# Patient Record
Sex: FEMALE | Employment: UNEMPLOYED | URBAN - METROPOLITAN AREA
[De-identification: names, ages, dates, MRNs, and addresses within clinical notes are randomized per-mention and may not be internally consistent; named-entity substitution may affect disease eponyms.]

---

## 2021-02-13 ENCOUNTER — HOSPITAL ENCOUNTER (INPATIENT)
Age: 51
LOS: 4 days | Discharge: HOME | DRG: 885 | End: 2021-02-17
Attending: PSYCHIATRY & NEUROLOGY | Admitting: PSYCHIATRY & NEUROLOGY
Payer: MEDICARE

## 2021-02-13 PROBLEM — F31.9 BIPOLAR DISORDER (HCC): Status: ACTIVE | Noted: 2021-02-13

## 2021-02-13 PROCEDURE — 65220000003 HC RM SEMIPRIVATE PSYCH

## 2021-02-13 PROCEDURE — 74011250637 HC RX REV CODE- 250/637: Performed by: NURSE PRACTITIONER

## 2021-02-13 RX ORDER — BENZTROPINE MESYLATE 1 MG/1
1 TABLET ORAL
Status: DISCONTINUED | OUTPATIENT
Start: 2021-02-13 | End: 2021-02-17 | Stop reason: HOSPADM

## 2021-02-13 RX ORDER — OLANZAPINE 5 MG/1
5 TABLET ORAL
Status: DISCONTINUED | OUTPATIENT
Start: 2021-02-13 | End: 2021-02-17 | Stop reason: HOSPADM

## 2021-02-13 RX ORDER — DIPHENHYDRAMINE HYDROCHLORIDE 50 MG/ML
50 INJECTION, SOLUTION INTRAMUSCULAR; INTRAVENOUS
Status: DISCONTINUED | OUTPATIENT
Start: 2021-02-13 | End: 2021-02-17 | Stop reason: HOSPADM

## 2021-02-13 RX ORDER — IBUPROFEN 400 MG/1
400 TABLET ORAL
Status: DISCONTINUED | OUTPATIENT
Start: 2021-02-13 | End: 2021-02-17 | Stop reason: HOSPADM

## 2021-02-13 RX ORDER — HALOPERIDOL 5 MG/ML
5 INJECTION INTRAMUSCULAR
Status: DISCONTINUED | OUTPATIENT
Start: 2021-02-13 | End: 2021-02-17 | Stop reason: HOSPADM

## 2021-02-13 RX ORDER — TRAZODONE HYDROCHLORIDE 50 MG/1
50 TABLET ORAL
Status: DISCONTINUED | OUTPATIENT
Start: 2021-02-13 | End: 2021-02-17 | Stop reason: HOSPADM

## 2021-02-13 RX ORDER — HYDROXYZINE 50 MG/1
50 TABLET, FILM COATED ORAL
Status: DISCONTINUED | OUTPATIENT
Start: 2021-02-13 | End: 2021-02-17 | Stop reason: HOSPADM

## 2021-02-13 RX ORDER — ACETAMINOPHEN 325 MG/1
650 TABLET ORAL
Status: DISCONTINUED | OUTPATIENT
Start: 2021-02-13 | End: 2021-02-17 | Stop reason: HOSPADM

## 2021-02-13 RX ORDER — ADHESIVE BANDAGE
30 BANDAGE TOPICAL DAILY PRN
Status: DISCONTINUED | OUTPATIENT
Start: 2021-02-13 | End: 2021-02-17 | Stop reason: HOSPADM

## 2021-02-13 RX ORDER — LORAZEPAM 2 MG/ML
1 INJECTION INTRAMUSCULAR
Status: DISCONTINUED | OUTPATIENT
Start: 2021-02-13 | End: 2021-02-17 | Stop reason: HOSPADM

## 2021-02-13 RX ADMIN — TRAZODONE HYDROCHLORIDE 50 MG: 50 TABLET ORAL at 23:44

## 2021-02-14 PROBLEM — G83.4 CAUDA EQUINA SYNDROME (HCC): Status: ACTIVE | Noted: 2021-02-14

## 2021-02-14 PROBLEM — Z72.0 TOBACCO ABUSE: Status: ACTIVE | Noted: 2021-02-14

## 2021-02-14 PROBLEM — M43.16 SPONDYLOLISTHESIS AT L4-L5 LEVEL: Status: ACTIVE | Noted: 2021-02-14

## 2021-02-14 PROCEDURE — 74011250637 HC RX REV CODE- 250/637: Performed by: NURSE PRACTITIONER

## 2021-02-14 PROCEDURE — 94640 AIRWAY INHALATION TREATMENT: CPT

## 2021-02-14 PROCEDURE — 65220000003 HC RM SEMIPRIVATE PSYCH

## 2021-02-14 PROCEDURE — 74011000250 HC RX REV CODE- 250: Performed by: NURSE PRACTITIONER

## 2021-02-14 RX ORDER — IBUPROFEN 800 MG/1
800 TABLET ORAL 3 TIMES DAILY
COMMUNITY
End: 2021-02-17

## 2021-02-14 RX ORDER — FENOFIBRATE 145 MG/1
145 TABLET, COATED ORAL DAILY
Status: DISCONTINUED | OUTPATIENT
Start: 2021-02-15 | End: 2021-02-17 | Stop reason: HOSPADM

## 2021-02-14 RX ORDER — IBUPROFEN 200 MG
1 TABLET ORAL DAILY
Status: DISCONTINUED | OUTPATIENT
Start: 2021-02-14 | End: 2021-02-15

## 2021-02-14 RX ORDER — BACLOFEN 10 MG/1
20 TABLET ORAL 3 TIMES DAILY
Status: DISCONTINUED | OUTPATIENT
Start: 2021-02-14 | End: 2021-02-17 | Stop reason: HOSPADM

## 2021-02-14 RX ORDER — MONTELUKAST SODIUM 10 MG/1
10 TABLET ORAL DAILY
COMMUNITY

## 2021-02-14 RX ORDER — QUETIAPINE FUMARATE 100 MG/1
300 TABLET, FILM COATED ORAL
Status: DISCONTINUED | OUTPATIENT
Start: 2021-02-14 | End: 2021-02-15

## 2021-02-14 RX ORDER — MONTELUKAST SODIUM 10 MG/1
10 TABLET ORAL DAILY
Status: DISCONTINUED | OUTPATIENT
Start: 2021-02-15 | End: 2021-02-17 | Stop reason: HOSPADM

## 2021-02-14 RX ORDER — QUETIAPINE FUMARATE 300 MG/1
300 TABLET, FILM COATED ORAL
COMMUNITY
End: 2021-02-17

## 2021-02-14 RX ORDER — BACLOFEN 20 MG/1
20 TABLET ORAL 3 TIMES DAILY
COMMUNITY

## 2021-02-14 RX ORDER — TAMSULOSIN HYDROCHLORIDE 0.4 MG/1
0.4 CAPSULE ORAL DAILY
Status: DISCONTINUED | OUTPATIENT
Start: 2021-02-15 | End: 2021-02-17 | Stop reason: HOSPADM

## 2021-02-14 RX ORDER — SUMATRIPTAN 50 MG/1
50 TABLET, FILM COATED ORAL
COMMUNITY
End: 2021-02-17

## 2021-02-14 RX ORDER — FENOFIBRATE 145 MG/1
145 TABLET, COATED ORAL DAILY
COMMUNITY

## 2021-02-14 RX ORDER — TAMSULOSIN HYDROCHLORIDE 0.4 MG/1
0.4 CAPSULE ORAL DAILY
COMMUNITY

## 2021-02-14 RX ORDER — AZELASTINE 1 MG/ML
2 SPRAY, METERED NASAL 2 TIMES DAILY
Status: DISCONTINUED | OUTPATIENT
Start: 2021-02-14 | End: 2021-02-17 | Stop reason: HOSPADM

## 2021-02-14 RX ORDER — BUDESONIDE AND FORMOTEROL FUMARATE DIHYDRATE 160; 4.5 UG/1; UG/1
2 AEROSOL RESPIRATORY (INHALATION) 2 TIMES DAILY
COMMUNITY

## 2021-02-14 RX ORDER — AZELASTINE 1 MG/ML
2 SPRAY, METERED NASAL 2 TIMES DAILY
COMMUNITY

## 2021-02-14 RX ADMIN — MENTHOL, METHYL SALICYLATE: 10; 15 CREAM TOPICAL at 20:24

## 2021-02-14 RX ADMIN — QUETIAPINE FUMARATE 300 MG: 100 TABLET ORAL at 20:21

## 2021-02-14 RX ADMIN — HYDROXYZINE HYDROCHLORIDE 50 MG: 50 TABLET, FILM COATED ORAL at 02:06

## 2021-02-14 RX ADMIN — OLANZAPINE 5 MG: 5 TABLET, FILM COATED ORAL at 02:06

## 2021-02-14 RX ADMIN — ACETAMINOPHEN 650 MG: 325 TABLET ORAL at 11:21

## 2021-02-14 RX ADMIN — ARFORMOTEROL TARTRATE: 15 SOLUTION RESPIRATORY (INHALATION) at 23:24

## 2021-02-14 RX ADMIN — IBUPROFEN 400 MG: 400 TABLET, FILM COATED ORAL at 14:46

## 2021-02-14 RX ADMIN — IBUPROFEN 400 MG: 400 TABLET, FILM COATED ORAL at 07:44

## 2021-02-14 NOTE — PROGRESS NOTES
[facility] 60  Master Treatment Plan for Job Liliana    Date Treatment Plan Initiated: 02/14/2021    Treatment Plan Modalities:  Type of Modality Amount  (x minutes) Frequency (x/week) Duration (x days) Name of Responsible Staff   [address]  meetings to encourage peer interactions 15 7 [address] [facility] and [address] [facility] psychotherapy to assist in building coping skills and internal controls 60 7 1 Lauren Nelson   Therapeutic activity groups to build coping skills 60 7 1 Lauren Nelson   Psychoeducation in group setting to address:   Medication education   15 [address] PharmD   Coping skills   30 3 1 Lauren Nelson   Relaxation techniques         Symptom management         Discharge planning   61 2 255 [facility]    60 2 1 Chaplain YOUNG   61 1 1 volunteer   Recovery/AA/NA      volunteer   Physician medication management   15 7 1 MD Michelle/ROSIBEL Sandoval   Family meeting/discharge planning   15 2 1 Dorcas Corbin and Pura Ching                                     Problem: Altered Thought Process (Adult/Pediatric)  Goal: *STG: Participates in treatment plan  Outcome: Progressing Towards Goal  Goal: *STG: Remains safe in hospital  Outcome: Progressing Towards Goal  Goal: *STG: Seeks staff when feelings of anxiety and fear arise  Outcome: Progressing Towards Goal  Goal: *STG: Decreased delusional thinking  Outcome: Progressing Towards Goal  Goal: *STG: Demonstrates ability to understand and use improved judgment in daily activities and relationships  Outcome: Progressing Towards Goal       Pt visible on the unit. Approaches nurses station and requests \"my muscle relaxer and ibuprofen\" RN offers education regarding treatment team process and orders needed for medications, pt accepts. PRN PO Ibuprofen 400mg given. Pt sitting in dining room among peers, eating breakfast and watching TV. Will continue to monitor with q15 minute checks for safety.

## 2021-02-14 NOTE — CONSULTS
[address] [facility] Adult  Hospitalist Group  History and Physical    Primary Care Provider: None  Date of Service:  2/14/2021    Subjective:     Eris Leon is a 48 y.o. female with a past medical history of COPD, Cauda Equina, L4-L5 Spondylolisthesis with central stenosis, and bipolar disorder. She presented to [facility] ED accompanied by law enforcement. Law enforcement were called to do a well-being check, they were notified that the patient was acting erratic. Upon their arrival she was yelling and screaming with erratic behavior. Stating that her ex- and daughter were trying to kill her so she needed to kill them first. Transferred to [place] [address] under [facility] for further treatment. She has had multiple psychiatric admission. Hospitalist was contacted for H&P and medical management. History was taken from patient and chart. She was cooperative throughout examination although had to redirect her back to the conversation multiple times. States that she is from [place] and has all of her follow up care there. She moved to [place] last October to get away from her parents and to start life over. States that she will have \"back surgery\" as soon as she can quit smoking. She denies any acute medical complaints. Feels that her feet are swollen. No edema noted on examination. Has right leg nerve damage that results in cramps, takes Baclofen. Has a \"mild case\" of COPD. Utilizes inhalers daily, not on home O2. Endorses bowel and bladder issues secondary to Cauda Equina. Feels that it takes her a long time to use the bathroom. Take Flomax for urinary retention. Denies any pain, syncope, dizziness, shortness of breath, chest pain or tightness, nausea, vomiting or diarrhea   . Review of Systems:    A comprehensive review of systems was negative except for that written in the History of Present Illness. No past medical history on file.    No past surgical history on file.  Prior to Admission medications    Medication Sig Start Date End Date Taking? Authorizing Provider   baclofen (LIORESAL) 20 mg tablet Take 20 mg by mouth three (3) times daily. Yes Provider, Historical   SUMAtriptan (Imitrex) 50 mg tablet Take 50 mg by mouth daily as needed for Migraine. Yes Provider, Historical   ibuprofen (MOTRIN) 800 mg tablet Take 800 mg by mouth three (3) times daily. Yes Provider, Historical   azelastine (ASTELIN) 137 mcg (0.1 %) nasal spray 2 Sprays by Both Nostrils route two (2) times a day. Use in each nostril as directed   Yes Provider, Historical   QUEtiapine (SEROqueL) 300 mg tablet Take 300 mg by mouth nightly. Yes Provider, Historical   tamsulosin (Flomax) 0.4 mg capsule Take 0.4 mg by mouth daily. Yes Provider, Historical   montelukast (Singulair) 10 mg tablet Take 10 mg by mouth daily. Yes Provider, Historical   fenofibrate nanocrystallized (TRICOR) 145 mg tablet Take 145 mg by mouth daily. Yes Provider, Historical   budesonide-formoteroL (Symbicort) 160-4.5 mcg/actuation HFAA Take 2 Puffs by inhalation two (2) times a day. Yes Provider, Historical     Allergies   Allergen Reactions    Pcn [Penicillins] Hives      No family history on file. SOCIAL HISTORY:  Patient resides at Home. Patient ambulates with Independent . Smoking history: 1ppd. Has smoked since she was 15years old. Was smoking 2-3 packs a day but is trying to quit so she can have surgery   Alcohol history: last drink was in June 2020. States that she is an alcoholic and it will kill her if she drinks. Quit heavy drinking 6 years ago. Illicit drug history: Smokes marijuana daily \"to help with the pain\". UDS + for THC only. Has a history of substance abuse.          Objective:       Physical Exam:   Visit Vitals  /82 (BP 1 Location: Right upper arm, BP Patient Position: Sitting)   Pulse 92   Temp 97.9 °F (36.6 °C)   Resp 16   Ht 5' 4\" (1.626 m)   Wt 68 kg (150 lb)   SpO2 96%   BMI 25.75 kg/m²     General appearance: alert, cooperative, no distress, appears stated age  Back: symmetric, no curvature. ROM normal. No CVA tenderness. Lungs: clear to auscultation bilaterally  Heart: regular rate and rhythm, S1, S2 normal, no murmur, click, rub or gallop  Abdomen: soft, non-tender. Bowel sounds normal. No masses,  no organomegaly  Extremities: extremities normal, atraumatic, no cyanosis or edema  Pulses: 2+ and symmetric  Skin: Skin color, texture, turgor normal. No rashes or lesions  Neurologic: Grossly normal  Cap refill: Brisk, less than 3 seconds  Pulses: 2+, symmetric in all extremities    ECG:  NA     Data Review: All diagnostic labs and studies have been reviewed. Chest x-ray NA. Assessment:     Active Problems:    Bipolar disorder ([facility] (2/13/2021)      Cauda equina syndrome ([facility] (2/14/2021)      Spondylolisthesis at L4-L5 level (2/14/2021)      Tobacco abuse (2/14/2021)        Plan:     1. COPD  - not in exacerbation, does not use home O2  - Keep O2 sats >88%, supplemental O2 as needed  - Start neb treatments  -  Follow up with PCP outpatient     2. Cauda Equina Syndrome/ Sponylolisthesis L4-L5   - Pain control  - Methyl Salicylate- menthol TID PRN   - Continue Flomax for urinary retention   - Follow up with Orthopedic Surgery outpatient. (had appointment on 02/04/2020)    3. Elevated creatine  - sCr- 1.16, unknown baseline   - Encourage PO intake   - Avoid nephrotoxins and renal dose medications   - Monitor labs   - Follow up with PCP outpatient. 4. Leukocytosis   - Likely reactive   - WBC 14.7  - No signs of infection noted. - Covid negative   - UA negative     5. Tobacco abuse   - Smoking cessation counseling completed   - Nicotine patch     6. Marijuana usage   - Cessation education completed     7. Bipolar disorder   - Management per primary team.       FUNCTIONAL STATUS PRIOR TO HOSPITALIZATION (including history of recent falls): Independent     Thank you for allowing us to participate in patient's care. If you have any further questions or need assistance please do not hesitate to contact us again. We will sign off now.      Signed By: Juanita Berger NP     February 14, 2021

## 2021-02-14 NOTE — BH NOTES
TRANSFER - IN REPORT:    Verbal report received from [place] on Mary Patel  being received from [facility] for routine progression of care      Report consisted of patients Situation, Background, Assessment and   Recommendations(SBAR). Information from the following report(s) SBAR was reviewed with the receiving nurse. Opportunity for questions and clarification was provided. Assessment completed upon patients arrival to unit and care assumed.

## 2021-02-14 NOTE — BH NOTES
Admission Note    Pt.  Received from [facility] ED  TDO  Calm and cooperative   AAO4  UDS +THC  BAL <10  Denies SI HI AH VH        PRN Medication Documentation-[id number]    Specific patient behavior that led to need for PRN medication: feeling restless  Staff interventions attempted prior to PRN being given: lights dim and quiet enviorment  PRN medication given: Trazadone  Patient response/effectiveness of PRN medication: pt. resting

## 2021-02-14 NOTE — PROGRESS NOTES
Problem: Altered Thought Process (Adult/Pediatric)  Goal: *STG: Remains safe in hospital  Outcome: Progressing Towards Goal     Problem: Falls - Risk of  Goal: *Absence of Falls  Description: Document Augusto Fall Risk and appropriate interventions in the flowsheet. Outcome: Progressing Towards Goal  Note: Fall Risk Interventions:     Medication Interventions: Teach patient to arise slowly    Received pt awake in bed during shift change. Some mild anxiety noted during quiet time. Staff will continue to monitor q 15 min checks.

## 2021-02-14 NOTE — BH NOTES
Primary Nurse Celestina Noble RN and Leann Strauss RN performed a dual skin assessment on this patient No impairment noted  Pranay score is 23      Pt has multiple tattoos all over body  Pressure Injury Documentation  (COMPLETE ONE LABEL PER PRESSURE INJURY)  For further information, please review corresponding Wound Care flowsheet. Dorene Calle has:    No pressure injury noted and pressure ulcer prevention initiated.         Celestina Noble RN

## 2021-02-14 NOTE — BH NOTES
PRN Medication Documentation    Specific patient behavior that led to need for PRN medication: pt c/o 5/10 aching burning pain to right and left foot   Staff interventions attempted prior to PRN being given: education   PRN medication given: po 400 mg Motrin   Patient response/effectiveness of PRN medication: pt alert and orient resting in bed

## 2021-02-14 NOTE — BH NOTES
PRN Medication Documentation    Specific patient behavior that led to need for PRN medication: restless, anxious, racing thoughts   Staff interventions attempted prior to PRN being given: education  PRN medication given: Zyprexa/Atarax  Patient response/effectiveness of PRN medication: not effective

## 2021-02-15 LAB
ATRIAL RATE: 101 BPM
CALCULATED P AXIS, ECG09: 42 DEGREES
CALCULATED R AXIS, ECG10: 73 DEGREES
CALCULATED T AXIS, ECG11: 60 DEGREES
DIAGNOSIS, 93000: NORMAL
P-R INTERVAL, ECG05: 120 MS
Q-T INTERVAL, ECG07: 338 MS
QRS DURATION, ECG06: 76 MS
QTC CALCULATION (BEZET), ECG08: 438 MS
VENTRICULAR RATE, ECG03: 101 BPM

## 2021-02-15 PROCEDURE — 74011250637 HC RX REV CODE- 250/637: Performed by: NURSE PRACTITIONER

## 2021-02-15 PROCEDURE — 65220000003 HC RM SEMIPRIVATE PSYCH

## 2021-02-15 PROCEDURE — 93005 ELECTROCARDIOGRAM TRACING: CPT

## 2021-02-15 PROCEDURE — 74011250637 HC RX REV CODE- 250/637: Performed by: PSYCHIATRY & NEUROLOGY

## 2021-02-15 RX ORDER — LANOLIN ALCOHOL/MO/W.PET/CERES
400 CREAM (GRAM) TOPICAL
Status: DISCONTINUED | OUTPATIENT
Start: 2021-02-15 | End: 2021-02-17 | Stop reason: HOSPADM

## 2021-02-15 RX ORDER — QUETIAPINE FUMARATE 100 MG/1
350 TABLET, FILM COATED ORAL
Status: DISCONTINUED | OUTPATIENT
Start: 2021-02-15 | End: 2021-02-17 | Stop reason: HOSPADM

## 2021-02-15 RX ORDER — FLUTICASONE PROPIONATE 50 MCG
2 SPRAY, SUSPENSION (ML) NASAL 2 TIMES DAILY
COMMUNITY
End: 2021-02-17

## 2021-02-15 RX ORDER — CETIRIZINE HCL 10 MG
10 TABLET ORAL DAILY
COMMUNITY
End: 2021-02-17

## 2021-02-15 RX ORDER — IBUPROFEN 200 MG
1 TABLET ORAL DAILY
Status: DISCONTINUED | OUTPATIENT
Start: 2021-02-15 | End: 2021-02-17 | Stop reason: HOSPADM

## 2021-02-15 RX ORDER — IBUPROFEN 200 MG
1 TABLET ORAL DAILY
Status: DISCONTINUED | OUTPATIENT
Start: 2021-02-16 | End: 2021-02-15

## 2021-02-15 RX ORDER — LANOLIN ALCOHOL/MO/W.PET/CERES
400 CREAM (GRAM) TOPICAL DAILY
COMMUNITY

## 2021-02-15 RX ORDER — BUDESONIDE AND FORMOTEROL FUMARATE DIHYDRATE 160; 4.5 UG/1; UG/1
2 AEROSOL RESPIRATORY (INHALATION)
Status: DISCONTINUED | OUTPATIENT
Start: 2021-02-15 | End: 2021-02-17 | Stop reason: HOSPADM

## 2021-02-15 RX ADMIN — ACETAMINOPHEN 650 MG: 325 TABLET ORAL at 06:43

## 2021-02-15 RX ADMIN — MENTHOL, METHYL SALICYLATE: 10; 15 CREAM TOPICAL at 08:28

## 2021-02-15 RX ADMIN — BUDESONIDE AND FORMOTEROL FUMARATE DIHYDRATE 2 PUFF: 160; 4.5 AEROSOL RESPIRATORY (INHALATION) at 10:08

## 2021-02-15 RX ADMIN — Medication 400 MG: at 21:14

## 2021-02-15 RX ADMIN — IBUPROFEN 400 MG: 400 TABLET, FILM COATED ORAL at 02:38

## 2021-02-15 RX ADMIN — ACETAMINOPHEN 650 MG: 325 TABLET ORAL at 23:26

## 2021-02-15 RX ADMIN — BACLOFEN 20 MG: 10 TABLET ORAL at 21:52

## 2021-02-15 RX ADMIN — BACLOFEN 20 MG: 10 TABLET ORAL at 16:35

## 2021-02-15 RX ADMIN — MENTHOL, METHYL SALICYLATE: 10; 15 CREAM TOPICAL at 16:36

## 2021-02-15 RX ADMIN — AZELASTINE 2 SPRAY: 1 SPRAY, METERED NASAL at 08:29

## 2021-02-15 RX ADMIN — BACLOFEN 20 MG: 10 TABLET ORAL at 10:33

## 2021-02-15 RX ADMIN — HYDROXYZINE HYDROCHLORIDE 50 MG: 50 TABLET, FILM COATED ORAL at 23:26

## 2021-02-15 RX ADMIN — FENOFIBRATE 145 MG: 145 TABLET ORAL at 10:33

## 2021-02-15 RX ADMIN — BUDESONIDE AND FORMOTEROL FUMARATE DIHYDRATE 2 PUFF: 160; 4.5 AEROSOL RESPIRATORY (INHALATION) at 21:17

## 2021-02-15 RX ADMIN — AZELASTINE 2 SPRAY: 1 SPRAY, METERED NASAL at 16:37

## 2021-02-15 RX ADMIN — IBUPROFEN 400 MG: 400 TABLET, FILM COATED ORAL at 16:30

## 2021-02-15 RX ADMIN — QUETIAPINE FUMARATE 350 MG: 100 TABLET ORAL at 21:14

## 2021-02-15 RX ADMIN — TAMSULOSIN HYDROCHLORIDE 0.4 MG: 0.4 CAPSULE ORAL at 08:29

## 2021-02-15 RX ADMIN — MONTELUKAST 10 MG: 10 TABLET, FILM COATED ORAL at 08:29

## 2021-02-15 NOTE — BH NOTES
PSYCHOSOCIAL ASSESSMENT  :Patient identifying info:   Connie Hsu is a 48 y.o., female admitted 2021 10:49 PM     Presenting problem and precipitating factors: Patient was admitted to the ED at [facility] due to erratic behavior. Per triage, pt reported her ex  and daughter were trying to poison her, and her mother was out to kill her. Patient reports that she has not slept for days. These erratic behaviors occurred ~1 month ago. Hx of OD alcohol and pills- drinking bleach. Eva Maria shared that there are demons and ghosts who work for her to get rid of the weak. Mental status assessment: alert, oriented, endorses grandiose delusions about being a profit and having visions. She speaks of demons, dragons, magic and a mission to save the vulnerable. Strengths: patient has a home to return to and is connected to outpatient services. Collateral information: Francesco beckman    Current psychiatric /substance abuse providers and contact info: Dr. Gasca  at [place], [place]    Previous psychiatric/substance abuse providers and response to treatment: yes, last admission was in 2018     Family history of mental illness or substance abuse: father  by suicide    Substance abuse history:  Positive for [facility]   Social History     Tobacco Use    Smoking status: Current Every Day Smoker     Packs/day: 0.50     Years: 10.00     Pack years: 5.00    Smokeless tobacco: Never Used   Substance Use Topics    Alcohol use: Not on file       History of biomedical complications associated with substance abuse: none noted     Patient's current acceptance of treatment or motivation for change: Under a TDO     Family constellation: per patient, she is engaged and has 4 children. Is significant other involved? Yes     Describe support system: none noted    Describe living arrangements and home environment: patient lives with rk and son.      Health issues:   Hospital Problems  Never Reviewed          Codes Class Noted POA    Cauda equina syndrome ([facility] ICD-10-CM: G83.4  ICD-9-CM: 344.60  2021 Unknown        Spondylolisthesis at L4-L5 level ICD-10-CM: M43.16  ICD-9-CM: 756.12  2021 Unknown        Tobacco abuse ICD-10-CM: Z72.0  ICD-9-CM: 305.1  2021 Unknown        Bipolar disorder ([facility] ICD-10-CM: F31.9  ICD-9-CM: 296.80  2021 Unknown              Trauma history: none noted     Legal issues: none noted     History of  service: none noted     Financial status: SSDI     Muslim/cultural factors: none noted     Education/work history: pt took some college courses     Have you been licensed as a health care professional (current or ): no     Leisure and recreation preferences: none noted     Describe coping skills: limited, ineffectual     Lauren Nelson  2/15/2021

## 2021-02-15 NOTE — PROGRESS NOTES
Problem: Altered Thought Process (Adult/Pediatric)  Goal: *STG: Remains safe in hospital  Outcome: Progressing Towards Goal  Goal: Interventions  Outcome: Progressing Towards Goal     Problem: Patient Education: Go to Patient Education Activity  Goal: Patient/Family Education  Outcome: Progressing Towards Goal

## 2021-02-15 NOTE — BH NOTES
GROUP THERAPY PROGRESS NOTE    Patient is participating in [employer], Discharge & Goals Group. Group time: 50 minutes    Personal goal for participation: Identify an area in which individuals want to make a change     Goal orientation: Personal    Group therapy participation: active    Therapeutic interventions reviewed and discussed: Group discussion on how the healing process works on making change in an individuals life. Group members were handed a checklist of potential things they want to change, and they identified what the issues are, what they meant to ourselves and others, and support. Members also became aware of the various benefits of making these changes and how it increases motivation to persevere. Discussion was on concepts of prioritizing, focusing on one issue at a time, and the use of a step-by-step approach to set a goal on how this will be implemented in discharge. Impression of participation: Bruce How actively participated in group. Her goal is to quit smoking but pt expresses concern and worry doing so as she had tried in the past several times but was unable to. Patient processed her health and prioritized it in group. She acknowledged that it will be a working progress. Cooperative in group. Reports feeling better today.     Lauren Nelson, Supervisee in Social Work

## 2021-02-15 NOTE — PROGRESS NOTES
2300: Resting quietly in bed with eyes closed. No apparent distress. Respirations are even and unlabored. Staff will continue to monitor q15 throughout the shift. Problem: Falls - Risk of  Goal: *Absence of Falls  Description: Document Ulises Palomares Fall Risk and appropriate interventions in the flowsheet.   Outcome: Progressing Towards Goal  Note: Fall Risk Interventions:            Medication Interventions: Teach patient to arise slowly

## 2021-02-15 NOTE — PROGRESS NOTES
Admission Medication Reconciliation:    Information obtained from:  Patient interview, insurance claims data, [facility]  RxQuery data available¹:  YES    Comments/Recommendations: Updated PTA meds/reviewed patient's allergies. 1)  Patient is a good historian in regards to her medications. She reports that she takes her medications regularly. Insurance claims data confirm patient reported information. 2)  Medication changes (since last review): Added  - magnesium oxide 400mg daily  - cetirizine 10mg daily  - fluticasone nasal spray (uses in addition to azelastine)    3)  The [facility]) was assessed to determine fill history of any controlled medications. The patient has NOT filled any controlled medications in the last 6 months. Last was diazepam in 2020. ¹RxQuery pharmacy benefit data reflects medications filled and processed through the patient's insurance, however   this data does NOT capture whether the medication was picked up or is currently being taken by the patient. Allergies:  Pcn [penicillins]    Significant PMH/Disease States: No past medical history on file. Chief Complaint for this Admission:  No chief complaint on file. Prior to Admission Medications:   Prior to Admission Medications   Prescriptions Last Dose Informant Taking? QUEtiapine (SEROqueL) 300 mg tablet 2021 at Unknown time  Yes   Sig: Take 300 mg by mouth nightly. SUMAtriptan (Imitrex) 50 mg tablet 2021 at Unknown time  Yes   Sig: Take 50 mg by mouth daily as needed for Migraine. azelastine (ASTELIN) 137 mcg (0.1 %) nasal spray 2021 at Unknown time  Yes   Si Sprays by Both Nostrils route two (2) times a day. Use in each nostril as directed   baclofen (LIORESAL) 20 mg tablet 2021 at Unknown time  Yes   Sig: Take 20 mg by mouth three (3) times daily.    budesonide-formoteroL (Symbicort) 160-4.5 mcg/actuation HFAA 2021 at Unknown time  Yes   Sig: Take 2 Puffs by inhalation two (2) times a day. cetirizine (ZYRTEC) 10 mg tablet   Yes   Sig: Take 10 mg by mouth daily. fenofibrate nanocrystallized (TRICOR) 145 mg tablet 2021 at Unknown time  Yes   Sig: Take 145 mg by mouth daily. fluticasone propionate (FLONASE) 50 mcg/actuation nasal spray   Yes   Si Sprays by Both Nostrils route two (2) times a day. ibuprofen (MOTRIN) 800 mg tablet 2021 at Unknown time  Yes   Sig: Take 800 mg by mouth three (3) times daily. magnesium oxide (MAG-OX) 400 mg tablet   Yes   Sig: Take 400 mg by mouth daily. montelukast (Singulair) 10 mg tablet 2021 at Unknown time  Yes   Sig: Take 10 mg by mouth daily. tamsulosin (Flomax) 0.4 mg capsule 2021 at Unknown time  Yes   Sig: Take 0.4 mg by mouth daily.       Facility-Administered Medications: None       Kathya Valles, PHARMD

## 2021-02-15 NOTE — BH NOTES
GROUP THERAPY PROGRESS NOTE    Patient is participating in [address] [place] [address]. Group time: 50 minutes    Personal goal for participation: reduce symptoms of anxiety     Goal orientation: Personal    Group therapy participation: active    Therapeutic interventions reviewed and discussed: Group members were handed a worksheet that discussed four strategies for reducing anxiety: deep breathing, progressive muscle relaxation, imagery, and challenging irrational thoughts. These skills were taught in session and each member was encouraged to try each activity in group and process how it can be applied outside of group. Impression of participation: Glo Duffy actively participated in group. She was quieter this afternoon than the morning, but continued to participate in group. Presented with an anxious mood. At the end of group, patient met with MSW and expressed delusions that she is a \"prophet. \" Cooperative in group.      Lauren Nelson, Supervisee in Social Work

## 2021-02-15 NOTE — BH NOTES
GROUP THERAPY PROGRESS NOTE    Patient is participating in Mindfulness group. Group time: 45 minutes    Personal goal for participation: To discussion relaxation techniques and complete a guided imagery exercise. Goal orientation: Personal    Group therapy participation: active    Therapeutic interventions reviewed and discussed: Group discussion was focused ways to relax when feeling stressed or when experiencing negative emotions. Group members then participated in a guided imagery exercise. Group members discussed how they felt before, during and after the exercise. Impression of participation: Pt presented with euthymic mood, clear speech, logical thought process. Pt calm and cooperative, interacted appropriately with staff and peers. Pt stated she uses mindfulness meditation to decrease anxiety daily. Pt actively engaged in guided imagery and endorsed a significant decrease in anxiety following the meditation. Pt identified numerous self-relaxation techniques to use to decrease anxiety including taking a bath and going for a walk in nature.     DOLORES Alcantara

## 2021-02-15 NOTE — BH NOTES
GROUP THERAPY PROGRESS NOTE    Patient is participating in a Process group. Group time: 45 minutes     Personal goal for participation: Discuss patients strengths, positive aspects of life and positive affirmations to increase self-esteem and promote healing. Goal orientation: Personal    Group therapy participation: active    Therapeutic interventions reviewed and discussed: Completion of My Strengths and Qualities activity sheet. Group discussion on personal patients strengths including things they are good at, ways theyve helped others, what makes them unique, and challenges they have overcome. Discussion regarding positive things others say about patients and positive words patient speak over themselves. Group discussed the benefit and power of positive thinking and self-talk and its role in improving mental health challenges. Group discussed having a positive mind set as a helpful coping skill. Impression of participation: Pt presented with anxious mood, agitated, journaling throughout session, fixated on reading crime in the newspaper. Pt hyperverbal and escalated at times, redirectable by . Pt processed tendency to have low self-esteem and deprecating thoughts, states she has been in that place for so long it feels almost comfortable. Pt able to name numerous strengths including being supportive of other people and desiring to change. Pt processed current challenges including overcoming drug use and controlling anger.     DOLORES Noland

## 2021-02-15 NOTE — PROGRESS NOTES
PSYCHIATRIC PROGRESS NOTE       Patient: Connie Hsu MRN: [id number]  SSN: [id number]    YOB: 1970  Age: 48 y.o. Sex: female      Admit Date: 2/13/2021    LOS: 2 days       Chief Complaint:  I guess I am ok. Interval History:  Connie Hsu states she slept better last night. She states she needs to stay a few more days because she needs to work it out with \"verna, [place], oak, and eminem. \" She is convinced that she is a prophet, she can see the future, \"the bad future. \" \"It is our mission to save the weak, the children, the elderly. \" The demons and ghosts are working with her, \"its a lot of magic. \" She denies si or hi. Hallucinations and delusions prominent. She agreed to go up on seroquel. Past Medical History:  No past medical history on file.       ALLERGIES:(reviewed/updated 2/15/2021)  Allergies   Allergen Reactions    Pcn [Penicillins] Hives       Laboratory report:  No results found for: WBC, WBCLT, HGBPOC, HGB, HGBP, HCTPOC, HCT, PHCT, RBCH, PLT, MCV, HGBEXT, HCTEXT, PLTEXT No results found for: NA, K, CL, CO2, AGAP, GLU, BUN, CREA, BUCR, GFRAA, GFRNA, CA, TBIL, TBILI, AP, TP, ALB, GLOB, AGRAT, ALT   Vitals:    02/14/21 1145 02/14/21 1547 02/15/21 0729 02/15/21 1108   BP: (!) 140/83 122/82 (!) 149/84 (!) 143/87   Pulse: 100 92 88 90   Resp: 16 16 16 16   Temp: 97.4 °F (36.3 °C) 97.9 °F (36.6 °C) 98 °F (36.7 °C) 98 °F (36.7 °C)   SpO2: 99% 96% 98% 98%   Weight:       Height:           No results found for: VALF2, VALAC, VALP, VALPR, DS6, CRBAM, CRBAMP, CARB2, XCRBAM  No results found for: LITHM    Vital Signs  Patient Vitals for the past 24 hrs:   Temp Pulse Resp BP SpO2   02/15/21 1108 98 °F (36.7 °C) 90 16 (!) 143/87 98 %   02/15/21 0729 98 °F (36.7 °C) 88 16 (!) 149/84 98 %   02/14/21 1547 97.9 °F (36.6 °C) 92 16 122/82 96 %     Wt Readings from Last 3 Encounters:   02/14/21 68 kg (150 lb)     Temp Readings from Last 3 Encounters:   02/15/21 98 °F (36.7 °C)     BP Readings from Last 3 Encounters:   02/15/21 (!) 143/87     Pulse Readings from Last 3 Encounters:   02/15/21 90       Radiology (reviewed/updated 2/15/2021)  No results found.     Current Facility-Administered Medications   Medication Dose Route Frequency Provider Last Rate Last Admin    budesonide-formoteroL (SYMBICORT) 160-4.5 mcg/actuation HFA inhaler 2 Puff  2 Puff Inhalation BID RT José Antonio Alvarez MD   2 Puff at 02/15/21 1008    nicotine (NICODERM CQ) 21 mg/24 hr patch 1 Patch  1 Patch TransDERmal DAILY Shakira Sandoval NP   1 Patch at 02/14/21 0849    QUEtiapine (SEROquel) tablet 300 mg  300 mg Oral QHS Shakira Sandoval NP   300 mg at 02/14/21 2021    azelastine (ASTELIN) 137mcg/spray nasal spray  2 Spray Both Nostrils BID Mily Farooq NP   2 Spray at 02/15/21 6413    baclofen (LIORESAL) tablet 20 mg  20 mg Oral TID Mily Farooq NP   20 mg at 02/15/21 1033    fenofibrate nanocrystallized (TRICOR) tablet 145 mg  145 mg Oral DAILY Mily Brisker, NP   145 mg at 02/15/21 1033    montelukast (SINGULAIR) tablet 10 mg  10 mg Oral DAILY Mily Brnylaer, NP   10 mg at 02/15/21 0829    tamsulosin (FLOMAX) capsule 0.4 mg  0.4 mg Oral DAILY Mily Brisker, NP   0.4 mg at 02/15/21 2443    methyl salicylate-menthol (BENGAY) 15-10 % cream   Topical TID PRN Mily Farooq NP   Given at 02/15/21 0828    OLANZapine (ZyPREXA) tablet 5 mg  5 mg Oral Q6H PRN [place] VOGT NP   5 mg at 02/14/21 0206    haloperidol lactate (HALDOL) injection 5 mg  5 mg IntraMUSCular Q6H PRN Mayelin Rojo, NP        benztropine (COGENTIN) tablet 1 mg  1 mg Oral BID PRN Mayelin Rojo, NP        diphenhydrAMINE (BENADRYL) injection 50 mg  50 mg IntraMUSCular BID PRN Mayelin Rojo NP        hydrOXYzine HCL (ATARAX) tablet 50 mg  50 mg Oral TID PRN Mayelin Rojo NP   50 mg at 02/14/21 0206    LORazepam (ATIVAN) injection 1 mg  1 mg IntraMUSCular Q4H PRN Mayelin Rojo NP        traZODone (DESYREL) tablet 50 mg  50 mg Oral QHS PRN Mayelin Rojo NP   50 mg at 02/13/21 2344    acetaminophen (TYLENOL) tablet 650 mg  650 mg Oral Q4H PRN Kenton Rojoah A NP   650 mg at 02/15/21 0643    magnesium hydroxide (MILK OF MAGNESIA) 400 mg/5 mL oral suspension 30 mL  30 mL Oral DAILY PRN Mayelin Rojo A, NP        ibuprofen (MOTRIN) tablet 400 mg  400 mg Oral Q8H PRN Mayelin Rojo A NP   400 mg at 02/15/21 0238       Side Effects: (reviewed/updated 2/15/2021)  None reported or admitted to. Review of Systems: (reviewed/updated 2/15/2021)  Appetite: good  Sleep: good   All other Review of Systems: delusions    Mental Status Exam:  Eye contact: Good eye contact  Psychomotor activity: Relaxed   Speech is spontaneous  Thought process: Logical and goal directed   Mood is \"ok\"  Affect: Full   Perception: No avh  Suicidal ideation: No si  Homicidal ideation: No hi  Insight/judgment: Poor  Cognition is grossly intact      Physical Exam:  Musculoskeletal system: steady gait  Tremor not present  Cog wheeling not present      Assessment and Plan:  Caridad Lozano meets criteria for a diagnosis of Unspecified psychotic disorder, rule out schizoaffective disorder, rule out bipolar disorder with psychotic features; schizophrenia. Increase seroquel to 350mg qhs. Continue rest of medications as prescribed. We will closely monitor for safety. We will encourage reality orientation. Disposition planning to continue. I certify that this patients inpatient psychiatric hospital services furnished since the previous certification were, and continue to be, required for treatment that could reasonably be expected to improve the patient's condition, or for diagnostic study, and that the patient continues to need, on a daily basis, active treatment furnished directly by or requiring the supervision of inpatient psychiatric facility personnel.  In addition, the hospital records show that services furnished were intensive treatment services, admission or related services, or equivalent services.       Signed:  Boubacar Pierce NP  2/15/2021

## 2021-02-15 NOTE — INTERDISCIPLINARY ROUNDS
Behavioral Health Interdisciplinary Rounds Patient Name: Efren Ford  Age: 48 y.o. Room/Bed:  730/01 Primary Diagnosis: <principal problem not specified> Admission Status: Voluntary Readmission within 30 days: no 
Power of  in place: no 
Patient requires a blocked bed: no          Reason for blocked bed: VTE Prophylaxis: No 
 
Mobility needs/Fall risk: no 
Flu Vaccine : no  
Nutritional Plan: no 
Consults:         
Labs/Testing due today?: no 
 
Sleep hours:  5.5 Participation in Care/Groups:   
Medication Compliant?: Yes PRNS (last 24 hours): Pain Restraints (last 24 hours):  no 
  
CIWA (range last 24 hours): COWS (range last 24 hours): Alcohol screening (AUDIT) completed -   AUDIT Score: 0 If applicable, date SBIRT discussed in treatment team AND documented:  
AUDIT Screen Score: AUDIT Score: 0 Tobacco - patient is a smoker: Have You Used Tobacco in the Past 30 Days: Yes Illegal Drugs use: Have You Used Any Illegal Substances Over the Past 12 Months: Yes 
 
24 hour chart check complete: yes Patient goal(s) for today: meet treatment team, acclimate to unit Treatment team focus/goals: assess needs for treatment and safe discharge Progress note: Pt admitted with paranoid delusions about her family trying to kill her. LOS:  2  Expected LOS: TBD Financial concerns/prescription coverage:   
Family contact:      
Family requesting physician contact today:   
Discharge plan: return home with fiance and son. Access to weapons :        
Outpatient provider(s): Dr. Avani Calvert at [place], [place] Patient's preferred phone number for follow up call :  
Patient's preferred e-mail address : 
 
Participating treatment team members: Sania Henning NP; Alice Ellis RN; Marisabel Nicole, PharmD;  Radha Ag MSW

## 2021-02-15 NOTE — PROGRESS NOTES
Involuntary committal to [facility] TDO hearing         Problem: Altered Thought Process (Adult/Pediatric)  Goal: *STG: Participates in treatment plan  Outcome: Progressing Towards Goal  ACTIVE PARTICIPATION  IN GROUP SETTINGS    Goal: *STG: Remains safe in hospital  Outcome: Progressing Towards Goal  Pt denies any suicidal or homicidal thoughts. Contracts for safety. Remains on q 15 min safety checks. Goal: *STG: Seeks staff when feelings of anxiety and fear arise  Outcome: Progressing Towards Goal  APPEARS TO HAVE GOOD RELATIONSHIP WITH STAFF  Goal: *STG: Complies with medication therapy  Outcome: Progressing Towards Goal  MEDICATION COMPLIANT DENIES SIDE EFFECTS     STATED \"THERE IS GOOD WITCHERY BUT THAT'S ALL I WILL SAY. \"

## 2021-02-15 NOTE — H&P
[address] [facility] HISTORY AND PHYSICAL    Name:  Elke Ruiz  MR#:  [id number]  :  1970  ACCOUNT #:  [de-identified]  ADMIT DATE:  2021      INITIAL PSYCHIATRIC EVALUATION    CHIEF COMPLAINT:  \"I can predict the future. \"    HISTORY OF PRESENTING ILLNESS:  The patient is a 63-year-old female who is currently admitted at [address] [place] [address] on a voluntary basis. She states that she is currently seeing Dr. Americo Caballero at [place], [place], and is currently being treated for bipolar disorder. She stated she has been admitted numerous times in the past, and her last inpatient psychiatric admission was back in 2018. She is a transfer from [facility].  She stated she does not really know why she is here in the hospital, all she knows is that she had a psych premonition. She also has not slept in days due to the suspicion that she is being poisoned and that she has a mission to accomplish. She notes that she needs to save the planet. She stated this all started about a month ago. When she gets the power, her hand turns red. She states that the Gods have given her the power over a certain period of time. She reports that her daughter was attempting to kill her via serotonin poisoning. She is convinced that she is able to identify gifts in others. She also believes that Trump together with [place] Dakins and the Russians and Nazis will start a war \"to rid the world of the weak. \"  She is also convinced that her mother was trying to kill her. The patient has a history of overdose in a combination of alcohol and pills. She also has a history of drinking bleach. She reports that she currently has ghosts and demons working for her and that she is working against others who are trying to kill off the weak because they have gifts. Her urine drug screen was positive for marijuana, and she states she smokes it on a daily basis.   She currently denies suicidal ideation, homicidal ideation, but continues to endorse paranoia and delusions. PAST MEDICAL HISTORY:  See H and P. No past medical history on file. Labs: (reviewed/updated 2/16/2021)  Patient Vitals for the past 8 hrs:   BP Temp Pulse Resp SpO2   02/16/21 0725 (!) 145/84 97.4 °F (36.3 °C) 90 16 98 %     Labs Reviewed   METABOLIC PANEL, COMPREHENSIVE - Abnormal; Notable for the following components:       Result Value    Chloride 109 (*)     Glucose 110 (*)     All other components within normal limits   MAGNESIUM - Abnormal; Notable for the following components:    Magnesium 2.5 (*)     All other components within normal limits   CBC WITH AUTOMATED DIFF - Abnormal; Notable for the following components:    WBC 13.8 (*)     IMMATURE GRANULOCYTES 1 (*)     ABS. NEUTROPHILS 8.1 (*)     ABS. LYMPHOCYTES 4.5 (*)     ABS. IMM. GRANS. 0.1 (*)     All other components within normal limits   GLUCOSE, FASTING - Abnormal; Notable for the following components:    Glucose 110 (*)     All other components within normal limits   LIPID PANEL - Abnormal; Notable for the following components:    Triglyceride 198 (*)     LDL, calculated 103.4 (*)     All other components within normal limits   HEMOGLOBIN A1C WITH EAG     Lab Results   Component Value Date/Time    Sodium 139 02/16/2021 04:01 AM    Potassium 4.1 02/16/2021 04:01 AM    Chloride 109 (H) 02/16/2021 04:01 AM    CO2 24 02/16/2021 04:01 AM    Anion gap 6 02/16/2021 04:01 AM    Glucose 110 (H) 02/16/2021 04:01 AM    Glucose 110 (H) 02/16/2021 04:01 AM    BUN 9 02/16/2021 04:01 AM    Creatinine 0.77 02/16/2021 04:01 AM    BUN/Creatinine ratio 12 02/16/2021 04:01 AM    GFR est AA >60 02/16/2021 04:01 AM    GFR est non-AA >60 02/16/2021 04:01 AM    Calcium 9.8 02/16/2021 04:01 AM    Bilirubin, total 0.3 02/16/2021 04:01 AM    Alk.  phosphatase 47 02/16/2021 04:01 AM    Protein, total 7.2 02/16/2021 04:01 AM    Albumin 3.9 02/16/2021 04:01 AM    Globulin 3.3 02/16/2021 04:01 AM    A-G Ratio 1.2 02/16/2021 04:01 AM    ALT (SGPT) 35 02/16/2021 04:01 AM     Admission on 02/13/2021   Component Date Value Ref Range Status    Ventricular Rate 02/15/2021 101  BPM Final    Atrial Rate 02/15/2021 101  BPM Final    P-R Interval 02/15/2021 120  ms Final    QRS Duration 02/15/2021 76  ms Final    Q-T Interval 02/15/2021 338  ms Final    QTC Calculation (Bezet) 02/15/2021 438  ms Final    Calculated P Axis 02/15/2021 42  degrees Final    Calculated R Axis 02/15/2021 73  degrees Final    Calculated T Axis 02/15/2021 60  degrees Final    Diagnosis 02/15/2021    Final                    Value:Sinus tachycardia  No previous ECGs available  Confirmed by Areli Arango MD, Karin Abdullahi ([id number]) on 2/15/2021 1:55:36 PM      Sodium 02/16/2021 139  136 - 145 mmol/L Final    Potassium 02/16/2021 4.1  3.5 - 5.1 mmol/L Final    Chloride 02/16/2021 109* 97 - 108 mmol/L Final    CO2 02/16/2021 24  21 - 32 mmol/L Final    Anion gap 02/16/2021 6  5 - 15 mmol/L Final    Glucose 02/16/2021 110* 65 - 100 mg/dL Final    BUN 02/16/2021 9  6 - 20 MG/DL Final    Creatinine 02/16/2021 0.77  0.55 - 1.02 MG/DL Final    BUN/Creatinine ratio 02/16/2021 12  12 - 20   Final    GFR est AA 02/16/2021 >60  >60 ml/min/1.73m2 Final    GFR est non-AA 02/16/2021 >60  >60 ml/min/1.73m2 Final    Calcium 02/16/2021 9.8  8.5 - 10.1 MG/DL Final    Bilirubin, total 02/16/2021 0.3  0.2 - 1.0 MG/DL Final    ALT (SGPT) 02/16/2021 35  12 - 78 U/L Final    AST (SGOT) 02/16/2021 19  15 - 37 U/L Final    Alk.  phosphatase 02/16/2021 47  45 - 117 U/L Final    Protein, total 02/16/2021 7.2  6.4 - 8.2 g/dL Final    Albumin 02/16/2021 3.9  3.5 - 5.0 g/dL Final    Globulin 02/16/2021 3.3  2.0 - 4.0 g/dL Final    A-G Ratio 02/16/2021 1.2  1.1 - 2.2   Final    Magnesium 02/16/2021 2.5* 1.6 - 2.4 mg/dL Final    WBC 02/16/2021 13.8* 3.6 - 11.0 K/uL Final    RBC 02/16/2021 4.47  3.80 - 5.20 M/uL Final    HGB 02/16/2021 13.7  11.5 - 16.0 g/dL Final    HCT 02/16/2021 41.5  35.0 - 47.0 % Final    MCV 02/16/2021 92.8  80.0 - 99.0 FL Final    MCH 02/16/2021 30.6  26.0 - 34.0 PG Final    MCHC 02/16/2021 33.0  30.0 - 36.5 g/dL Final    RDW 02/16/2021 13.8  11.5 - 14.5 % Final    PLATELET 92/51/4553 978  150 - 400 K/uL Final    MPV 02/16/2021 10.4  8.9 - 12.9 FL Final    NRBC 02/16/2021 0.0  0  WBC Final    ABSOLUTE NRBC 02/16/2021 0.00  0.00 - 0.01 K/uL Final    NEUTROPHILS 02/16/2021 59  32 - 75 % Final    LYMPHOCYTES 02/16/2021 32  12 - 49 % Final    MONOCYTES 02/16/2021 7  5 - 13 % Final    EOSINOPHILS 02/16/2021 1  0 - 7 % Final    BASOPHILS 02/16/2021 0  0 - 1 % Final    IMMATURE GRANULOCYTES 02/16/2021 1* 0.0 - 0.5 % Final    ABS. NEUTROPHILS 02/16/2021 8.1* 1.8 - 8.0 K/UL Final    ABS. LYMPHOCYTES 02/16/2021 4.5* 0.8 - 3.5 K/UL Final    ABS. MONOCYTES 02/16/2021 1.0  0.0 - 1.0 K/UL Final    ABS. EOSINOPHILS 02/16/2021 0.1  0.0 - 0.4 K/UL Final    ABS. BASOPHILS 02/16/2021 0.1  0.0 - 0.1 K/UL Final    ABS. IMM.  GRANS. 02/16/2021 0.1* 0.00 - 0.04 K/UL Final    DF 02/16/2021 AUTOMATED    Final    Hemoglobin A1c 02/16/2021 5.5  4.0 - 5.6 % Final    Est. average glucose 02/16/2021 111  mg/dL Final    Glucose 02/16/2021 110* 65 - 100 MG/DL Final    LIPID PROFILE 02/16/2021        Final    Cholesterol, total 02/16/2021 191  <200 MG/DL Final    Triglyceride 02/16/2021 198* <150 MG/DL Final    HDL Cholesterol 02/16/2021 48  MG/DL Final    LDL, calculated 02/16/2021 103.4* 0 - 100 MG/DL Final    VLDL, calculated 02/16/2021 39.6  MG/DL Final    CHOL/HDL Ratio 02/16/2021 4.0  0.0 - 5.0   Final     Vitals:    02/15/21 1108 02/15/21 1552 02/15/21 2004 02/16/21 0725   BP: (!) 143/87 131/82 (!) 146/94 (!) 145/84   Pulse: 90 94 (!) 101 90   Resp: 16 16 18 16   Temp: 98 °F (36.7 °C) 98 °F (36.7 °C) 98.9 °F (37.2 °C) 97.4 °F (36.3 °C)   SpO2: 98% 99% 99% 98%   Weight:       Height:         Recent Results (from the past 24 hour(s))   EKG, 12 LEAD, INITIAL    Collection Time: 02/15/21 12:55 PM   Result Value Ref Range    Ventricular Rate 101 BPM    Atrial Rate 101 BPM    P-R Interval 120 ms    QRS Duration 76 ms    Q-T Interval 338 ms    QTC Calculation (Bezet) 438 ms    Calculated P Axis 42 degrees    Calculated R Axis 73 degrees    Calculated T Axis 60 degrees    Diagnosis       Sinus tachycardia  No previous ECGs available  Confirmed by Areli Arango MD, Karin Abdullahi ([id number]) on 2/15/2021 8:57:99 PM     METABOLIC PANEL, COMPREHENSIVE    Collection Time: 02/16/21  4:01 AM   Result Value Ref Range    Sodium 139 136 - 145 mmol/L    Potassium 4.1 3.5 - 5.1 mmol/L    Chloride 109 (H) 97 - 108 mmol/L    CO2 24 21 - 32 mmol/L    Anion gap 6 5 - 15 mmol/L    Glucose 110 (H) 65 - 100 mg/dL    BUN 9 6 - 20 MG/DL    Creatinine 0.77 0.55 - 1.02 MG/DL    BUN/Creatinine ratio 12 12 - 20      GFR est AA >60 >60 ml/min/1.73m2    GFR est non-AA >60 >60 ml/min/1.73m2    Calcium 9.8 8.5 - 10.1 MG/DL    Bilirubin, total 0.3 0.2 - 1.0 MG/DL    ALT (SGPT) 35 12 - 78 U/L    AST (SGOT) 19 15 - 37 U/L    Alk.  phosphatase 47 45 - 117 U/L    Protein, total 7.2 6.4 - 8.2 g/dL    Albumin 3.9 3.5 - 5.0 g/dL    Globulin 3.3 2.0 - 4.0 g/dL    A-G Ratio 1.2 1.1 - 2.2     MAGNESIUM    Collection Time: 02/16/21  4:01 AM   Result Value Ref Range    Magnesium 2.5 (H) 1.6 - 2.4 mg/dL   CBC WITH AUTOMATED DIFF    Collection Time: 02/16/21  4:01 AM   Result Value Ref Range    WBC 13.8 (H) 3.6 - 11.0 K/uL    RBC 4.47 3.80 - 5.20 M/uL    HGB 13.7 11.5 - 16.0 g/dL    HCT 41.5 35.0 - 47.0 %    MCV 92.8 80.0 - 99.0 FL    MCH 30.6 26.0 - 34.0 PG    MCHC 33.0 30.0 - 36.5 g/dL    RDW 13.8 11.5 - 14.5 %    PLATELET 316 030 - 072 K/uL    MPV 10.4 8.9 - 12.9 FL    NRBC 0.0 0  WBC    ABSOLUTE NRBC 0.00 0.00 - 0.01 K/uL    NEUTROPHILS 59 32 - 75 %    LYMPHOCYTES 32 12 - 49 %    MONOCYTES 7 5 - 13 %    EOSINOPHILS 1 0 - 7 %    BASOPHILS 0 0 - 1 %    IMMATURE GRANULOCYTES 1 (H) 0.0 - 0.5 %    ABS. NEUTROPHILS 8.1 (H) 1.8 - 8.0 K/UL    ABS. LYMPHOCYTES 4.5 (H) 0.8 - 3.5 K/UL    ABS. MONOCYTES 1.0 0.0 - 1.0 K/UL    ABS. EOSINOPHILS 0.1 0.0 - 0.4 K/UL    ABS. BASOPHILS 0.1 0.0 - 0.1 K/UL    ABS. IMM. GRANS. 0.1 (H) 0.00 - 0.04 K/UL    DF AUTOMATED     HEMOGLOBIN A1C WITH EAG    Collection Time: 02/16/21  4:01 AM   Result Value Ref Range    Hemoglobin A1c 5.5 4.0 - 5.6 %    Est. average glucose 111 mg/dL   GLUCOSE, FASTING    Collection Time: 02/16/21  4:01 AM   Result Value Ref Range    Glucose 110 (H) 65 - 100 MG/DL   LIPID PANEL    Collection Time: 02/16/21  4:01 AM   Result Value Ref Range    LIPID PROFILE          Cholesterol, total 191 <200 MG/DL    Triglyceride 198 (H) <150 MG/DL    HDL Cholesterol 48 MG/DL    LDL, calculated 103.4 (H) 0 - 100 MG/DL    VLDL, calculated 39.6 MG/DL    CHOL/HDL Ratio 4.0 0.0 - 5.0         RADIOLOGY REPORTS:  No results found for this or any previous visit. No results found. PAST PSYCHIATRIC HISTORY:  See above. PSYCHOSOCIAL HISTORY:  She reports that she is engaged. She has 4 children. She is currently on SSDI. She states that she took some courses in college. She lives with her fiance and her son. MENTAL STATUS EXAMINATION:  She is alert and oriented in all spheres. She is dressed in street clothes. She reports her mood is okay. Affect is blunted. Speech normal rate and rhythm. Thought process logical and goal directed. She denies suicidal ideation, homicidal ideation, auditory or visual hallucination. Paranoia and delusions noteworthy. Memory seems intact. Intelligence seems average. Insight is poor. Judgment is poor. DIAGNOSES:  Unspecified psychotic disorder, rule out schizoaffective disorder, rule out bipolar disorder with psychotic features; rule out schizophrenia. TREATMENT PLANNING:  I will continue her inpatient stay. She will be provided good support and encouraged to attend groups.   Her safety will be monitored. Her medications will be modified and assessed. Case Management will work on discharge planning. ASSETS AND STRENGTHS:  She is willing to take medications. ESTIMATED LENGTH OF STAY:  5-7 days.         BAR CAI NP      [id number]  D:  02/14/2021 21:45  T:  02/15/2021 2:53  JOB #:  [id number]

## 2021-02-15 NOTE — PROGRESS NOTES
Laboratory Monitoring for Antipsychotics: This patient is currently prescribed the following medication(s):   Current Facility-Administered Medications   Medication Dose Route Frequency    budesonide-formoteroL (SYMBICORT) 160-4.5 mcg/actuation HFA inhaler 2 Puff  2 Puff Inhalation BID RT    magnesium oxide (MAG-OX) tablet 400 mg  400 mg Oral QHS    nicotine (NICODERM CQ) 14 mg/24 hr patch 1 Patch  1 Patch TransDERmal DAILY    QUEtiapine (SEROquel) tablet 350 mg  350 mg Oral QHS    azelastine (ASTELIN) 137mcg/spray nasal spray  2 Spray Both Nostrils BID    baclofen (LIORESAL) tablet 20 mg  20 mg Oral TID    fenofibrate nanocrystallized (TRICOR) tablet 145 mg  145 mg Oral DAILY    montelukast (SINGULAIR) tablet 10 mg  10 mg Oral DAILY    tamsulosin (FLOMAX) capsule 0.4 mg  0.4 mg Oral DAILY     The following labs have been completed for monitoring of antipsychotics and/or mood stabilizers:    Height, Weight, BMI Estimation  Estimated body mass index is 25.75 kg/m² as calculated from the following:    Height as of this encounter: 162.6 cm (64\"). Weight as of this encounter: 68 kg (150 lb). Vital Signs/Blood Pressure  Visit Vitals  /82 (BP 1 Location: Right upper arm, BP Patient Position: Sitting)   Pulse 94   Temp 98 °F (36.7 °C)   Resp 16   Ht 162.6 cm (64\")   Wt 68 kg (150 lb)   SpO2 99%   BMI 25.75 kg/m²     Assessment/Plan:  Will order lipid panel and hemoglobin A1c or fasting glucose to complete the recommended baseline laboratory monitoring based on the patient's current medication regimen.         Luis Alberto Jenkins, PHARMD

## 2021-02-15 NOTE — BH NOTES
Admission reviewed for medical necessity. Will follow with care [facility].

## 2021-02-16 LAB
ALBUMIN SERPL-MCNC: 3.9 G/DL (ref 3.5–5)
ALBUMIN/GLOB SERPL: 1.2 {RATIO} (ref 1.1–2.2)
ALP SERPL-CCNC: 47 U/L (ref 45–117)
ALT SERPL-CCNC: 35 U/L (ref 12–78)
ANION GAP SERPL CALC-SCNC: 6 MMOL/L (ref 5–15)
AST SERPL-CCNC: 19 U/L (ref 15–37)
BASOPHILS # BLD: 0.1 K/UL (ref 0–0.1)
BASOPHILS NFR BLD: 0 % (ref 0–1)
BILIRUB SERPL-MCNC: 0.3 MG/DL (ref 0.2–1)
BUN SERPL-MCNC: 9 MG/DL (ref 6–20)
BUN/CREAT SERPL: 12 (ref 12–20)
CALCIUM SERPL-MCNC: 9.8 MG/DL (ref 8.5–10.1)
CHLORIDE SERPL-SCNC: 109 MMOL/L (ref 97–108)
CHOLEST SERPL-MCNC: 191 MG/DL
CO2 SERPL-SCNC: 24 MMOL/L (ref 21–32)
CREAT SERPL-MCNC: 0.77 MG/DL (ref 0.55–1.02)
DIFFERENTIAL METHOD BLD: ABNORMAL
EOSINOPHIL # BLD: 0.1 K/UL (ref 0–0.4)
EOSINOPHIL NFR BLD: 1 % (ref 0–7)
ERYTHROCYTE [DISTWIDTH] IN BLOOD BY AUTOMATED COUNT: 13.8 % (ref 11.5–14.5)
EST. AVERAGE GLUCOSE BLD GHB EST-MCNC: 111 MG/DL
GLOBULIN SER CALC-MCNC: 3.3 G/DL (ref 2–4)
GLUCOSE P FAST SERPL-MCNC: 110 MG/DL (ref 65–100)
GLUCOSE SERPL-MCNC: 110 MG/DL (ref 65–100)
HBA1C MFR BLD: 5.5 % (ref 4–5.6)
HCT VFR BLD AUTO: 41.5 % (ref 35–47)
HDLC SERPL-MCNC: 48 MG/DL
HDLC SERPL: 4 {RATIO} (ref 0–5)
HGB BLD-MCNC: 13.7 G/DL (ref 11.5–16)
IMM GRANULOCYTES # BLD AUTO: 0.1 K/UL (ref 0–0.04)
IMM GRANULOCYTES NFR BLD AUTO: 1 % (ref 0–0.5)
LDLC SERPL CALC-MCNC: 103.4 MG/DL (ref 0–100)
LIPID PROFILE,FLP: ABNORMAL
LYMPHOCYTES # BLD: 4.5 K/UL (ref 0.8–3.5)
LYMPHOCYTES NFR BLD: 32 % (ref 12–49)
MAGNESIUM SERPL-MCNC: 2.5 MG/DL (ref 1.6–2.4)
MCH RBC QN AUTO: 30.6 PG (ref 26–34)
MCHC RBC AUTO-ENTMCNC: 33 G/DL (ref 30–36.5)
MCV RBC AUTO: 92.8 FL (ref 80–99)
MONOCYTES # BLD: 1 K/UL (ref 0–1)
MONOCYTES NFR BLD: 7 % (ref 5–13)
NEUTS SEG # BLD: 8.1 K/UL (ref 1.8–8)
NEUTS SEG NFR BLD: 59 % (ref 32–75)
NRBC # BLD: 0 K/UL (ref 0–0.01)
NRBC BLD-RTO: 0 PER 100 WBC
PLATELET # BLD AUTO: 373 K/UL (ref 150–400)
PMV BLD AUTO: 10.4 FL (ref 8.9–12.9)
POTASSIUM SERPL-SCNC: 4.1 MMOL/L (ref 3.5–5.1)
PROT SERPL-MCNC: 7.2 G/DL (ref 6.4–8.2)
RBC # BLD AUTO: 4.47 M/UL (ref 3.8–5.2)
SODIUM SERPL-SCNC: 139 MMOL/L (ref 136–145)
TRIGL SERPL-MCNC: 198 MG/DL (ref ?–150)
VLDLC SERPL CALC-MCNC: 39.6 MG/DL
WBC # BLD AUTO: 13.8 K/UL (ref 3.6–11)

## 2021-02-16 PROCEDURE — 85025 COMPLETE CBC W/AUTO DIFF WBC: CPT

## 2021-02-16 PROCEDURE — 80053 COMPREHEN METABOLIC PANEL: CPT

## 2021-02-16 PROCEDURE — 74011250637 HC RX REV CODE- 250/637: Performed by: NURSE PRACTITIONER

## 2021-02-16 PROCEDURE — 74011250637 HC RX REV CODE- 250/637: Performed by: PSYCHIATRY & NEUROLOGY

## 2021-02-16 PROCEDURE — 83735 ASSAY OF MAGNESIUM: CPT

## 2021-02-16 PROCEDURE — 65220000003 HC RM SEMIPRIVATE PSYCH

## 2021-02-16 PROCEDURE — 83036 HEMOGLOBIN GLYCOSYLATED A1C: CPT

## 2021-02-16 PROCEDURE — 36415 COLL VENOUS BLD VENIPUNCTURE: CPT

## 2021-02-16 PROCEDURE — 80061 LIPID PANEL: CPT

## 2021-02-16 PROCEDURE — 82947 ASSAY GLUCOSE BLOOD QUANT: CPT

## 2021-02-16 RX ADMIN — AZELASTINE 2 SPRAY: 1 SPRAY, METERED NASAL at 16:17

## 2021-02-16 RX ADMIN — MONTELUKAST 10 MG: 10 TABLET, FILM COATED ORAL at 07:55

## 2021-02-16 RX ADMIN — QUETIAPINE FUMARATE 350 MG: 100 TABLET ORAL at 21:11

## 2021-02-16 RX ADMIN — BUDESONIDE AND FORMOTEROL FUMARATE DIHYDRATE 2 PUFF: 160; 4.5 AEROSOL RESPIRATORY (INHALATION) at 21:00

## 2021-02-16 RX ADMIN — AZELASTINE 2 SPRAY: 1 SPRAY, METERED NASAL at 07:56

## 2021-02-16 RX ADMIN — TAMSULOSIN HYDROCHLORIDE 0.4 MG: 0.4 CAPSULE ORAL at 07:55

## 2021-02-16 RX ADMIN — BACLOFEN 20 MG: 10 TABLET ORAL at 16:16

## 2021-02-16 RX ADMIN — ACETAMINOPHEN 650 MG: 325 TABLET ORAL at 07:57

## 2021-02-16 RX ADMIN — HYDROXYZINE HYDROCHLORIDE 50 MG: 50 TABLET, FILM COATED ORAL at 17:33

## 2021-02-16 RX ADMIN — MENTHOL, METHYL SALICYLATE: 10; 15 CREAM TOPICAL at 21:21

## 2021-02-16 RX ADMIN — Medication 400 MG: at 21:11

## 2021-02-16 RX ADMIN — FENOFIBRATE 145 MG: 145 TABLET ORAL at 07:56

## 2021-02-16 RX ADMIN — BACLOFEN 20 MG: 10 TABLET ORAL at 21:11

## 2021-02-16 RX ADMIN — BACLOFEN 20 MG: 10 TABLET ORAL at 07:55

## 2021-02-16 RX ADMIN — BUDESONIDE AND FORMOTEROL FUMARATE DIHYDRATE 2 PUFF: 160; 4.5 AEROSOL RESPIRATORY (INHALATION) at 09:00

## 2021-02-16 RX ADMIN — ACETAMINOPHEN 650 MG: 325 TABLET ORAL at 16:16

## 2021-02-16 RX ADMIN — IBUPROFEN 400 MG: 400 TABLET, FILM COATED ORAL at 04:56

## 2021-02-16 NOTE — BH NOTES
GROUP THERAPY PROGRESS NOTE    Patient is participating in Self-Care Group. Group time: 45 minutes    Personal goal for participation: Practice and plan for self-care    Goal orientation: Personal    Group therapy participation: active    Therapeutic interventions reviewed and discussed: Group discussion on the meaning and importance of self-care. Examining the self-care wheel in six different dimensions: physical, psychological, professional, personal, spiritual, and emotional. Members of this group will create a detailed, self-care strategy schedule to incorporate into their daily routine. Impression of participation: Ivanna De actively participated in group. She shared her biggest goal when it comes to self-care is to speak up for herself. Pt briefly elaborated on this, and created her schedule to incorporate deep breathing, meditating, yoga and time for listening to music. Presented with clear speech and a better mood, but delusional at times in group.      Lauren Nelson, Supervisee in Social Work

## 2021-02-16 NOTE — PROGRESS NOTES
Problem: Falls - Risk of  Goal: *Absence of Falls  Description: Document Apple Chung Fall Risk and appropriate interventions in the flowsheet.   Outcome: Progressing Towards Goal  Note: Fall Risk Interventions:            Medication Interventions: Teach patient to arise slowly         Pt appears asleep in bed, NAD, even respirations, will continue to monitor q15min              Specific patient behavior that led to need for PRN medication: 4/10 hip pain, anxiety  Staff interventions attempted prior to PRN being given: Relaxation, dim lights  PRN medication given: Tylenol 650mg Po, Atarax 50mg PO at 2326  Patient response/effectiveness of PRN medication: pt appears to be asleep in bed    Specific patient behavior that led to need for PRN medication: 3/10 back pain  Staff interventions attempted prior to PRN being given: Relaxation, listening  PRN medication given: Motrin 400mg PO at 0456  Patient response/effectiveness of PRN medication: will continue to assess

## 2021-02-16 NOTE — BH NOTES
GROUP THERAPY PROGRESS NOTE    Patient is participating in Substance Abuse group. Group time: 50 minutes    Personal goal for participation: To understand addiction and admitting powerlessness     Goal orientation: Personal    Group therapy participation: active    Therapeutic interventions reviewed and discussed: Group discussion of substance use, abuse, and dependence and the first step of the 12-step program. Patients were able to share their experiences with alcohol and drugs and identify whether they are more afraid of doing drugs and drinking alcohol or not doing it. Members were given the opportunity to admit whether they felt powerless over their substance abuse, and to express their life structure with addiction. Each patient wrote down consequences related to their addiction and made a list of ways they have been powerless to drugs/alcohol and reflected on this in group. Group therapist then shared the rest of the parts to the first step and these were: speak up at a 12-step program, get an [place] [id number] sponsor, tell someone if you feel like drinking o using drugs, and tell someone if you use substances. Members were given these two questions: What do I need to keep this?  and What happens if I dont?  to reflect on and answer in group. Impression of participation: Marylu Figueroa actively participated in group. She shared his substance abuse history and her recovery journey from drugs, to alcohol, to pain killers. Pt demonstrated understanding of the first step of the 12 steps approach. Cooperative in group.      Lauren Nelson, Supervisee in Social Work

## 2021-02-16 NOTE — PROGRESS NOTES
Problem: Altered Thought Process (Adult/Pediatric)  Goal: *STG: Remains safe in hospital  Outcome: Progressing Towards Goal  Goal: *STG: Seeks staff when feelings of anxiety and fear arise  Outcome: Progressing Towards Goal  Goal: *STG: Complies with medication therapy  Outcome: Progressing Towards Goal     Problem: Altered Thought Process (Adult/Pediatric)  Goal: *STG: Decreased delusional thinking  Outcome: Not Progressing Towards Goal

## 2021-02-16 NOTE — INTERDISCIPLINARY ROUNDS
Behavioral Health Interdisciplinary Rounds Patient Name: Khalif Edwards  Age: 48 y.o. Room/Bed:  730/ Primary Diagnosis: <principal problem not specified> Admission Status: Involuntary Commitment Readmission within 30 days: no 
Power of  in place: no 
Patient requires a blocked bed: no          Reason for blocked bed: VTE Prophylaxis: No 
 
Mobility needs/Fall risk: no 
Flu Vaccine : no  
Nutritional Plan: no 
Consults:         
Labs/Testing due today?: yes/completed Sleep hours:  3 Participation in Care/Groups:  yes Medication Compliant?: Yes PRNS (last 24 hours): Antianxiety and Pain Restraints (last 24 hours):  no 
  
CIWA (range last 24 hours): COWS (range last 24 hours): Alcohol screening (AUDIT) completed -   AUDIT Score: 0 If applicable, date SBIRT discussed in treatment team AND documented:  
AUDIT Screen Score: AUDIT Score: 0 Tobacco - patient is a smoker: Have You Used Tobacco in the Past 30 Days: Yes Illegal Drugs use: Have You Used Any Illegal Substances Over the Past 12 Months: Yes 
 
24 hour chart check complete: yes Patient goal(s) for today:  
Treatment team focus/goals: Plan to continue to titrate her medications. Progress note : She has been compliant with her medications and has not been a behavioral issues. LOS:  3  Expected LOS: TBD Financial concerns/prescription coverage:  Medicare Family contact:      
Family requesting physician contact today:   
Discharge plan: : return home with fiance and son. Access to weapons :  No       
Outpatient provider(s): Dr. Marielos Gerard at [place], [place] Patient's preferred phone number for follow up call :  
Patient's preferred e-mail address : 
Participating treatment team members: Khalif Edwards, [address] [facility], [place] , [address] [place] [address]

## 2021-02-16 NOTE — BH NOTES
PRN Medication Documentation    Specific patient behavior that led to need for PRN medication: Patient observed tearful and distracted after phone call with her son who informed her, \"Im not getting out of here. \"  Staff interventions attempted prior to PRN being given: 1:1, emotional support, listening   PRN medication given: atarax 50mg PO  Patient response/effectiveness of PRN medication: Will continue to monitor. 1810  No longer tearful, talking to family member again.

## 2021-02-16 NOTE — PROGRESS NOTES
Problem: Discharge Planning  Goal: *Discharge to safe environment  2/16/2021 1338 by Anival Grace  Outcome: Progressing Towards Goal  2/16/2021 1328 by Anival Grace  Outcome: Progressing Towards Goal  Goal: *Knowledge of medication management  2/16/2021 1338 by Anival Grace  Outcome: Progressing Towards Goal  2/16/2021 1328 by Anival Grace  Outcome: Progressing Towards Goal  Goal: *Knowledge of discharge instructions  2/16/2021 1338 by Anival Grace  Outcome: Progressing Towards Goal  2/16/2021 1328 by Anival Grace  Outcome: Progressing Towards Goal

## 2021-02-16 NOTE — BH NOTES
Patient has been given 3 prune juices on current evening shift. Patient very focused on \"not going to the bathroom\". Prune juice given warmed. Patient educated on telling staff when she does have a BM. Will continue to monitor and educate.

## 2021-02-16 NOTE — PROGRESS NOTES
PSYCHIATRIC PROGRESS NOTE       Patient: Leonie Ventura MRN: [id number]  SSN: [id number]    YOB: 1970  Age: 48 y.o. Sex: female      Admit Date: 2/13/2021    LOS: 3 days       Chief Complaint:  I am good. Interval History:  Leonie Ventura states \"baclofen\" almost killed her last night. She was convinced it was not baclofen but \"poison. \" She remains delusional. She believes she has superpowers. She denies seeing ghosts or demons. Calm and pleasantly psychotic. Tolerating increased of seroquel and denies adverse effects. Denies si or hi. Past Medical History:  No past medical history on file. ALLERGIES:(reviewed/updated 2/16/2021)  Allergies   Allergen Reactions    Pcn [Penicillins] Hives       Laboratory report:  Lab Results   Component Value Date/Time    WBC 13.8 (H) 02/16/2021 04:01 AM    HGB 13.7 02/16/2021 04:01 AM    HCT 41.5 02/16/2021 04:01 AM    PLATELET 873 41/26/3819 04:01 AM    MCV 92.8 02/16/2021 04:01 AM      Lab Results   Component Value Date/Time    Sodium 139 02/16/2021 04:01 AM    Potassium 4.1 02/16/2021 04:01 AM    Chloride 109 (H) 02/16/2021 04:01 AM    CO2 24 02/16/2021 04:01 AM    Anion gap 6 02/16/2021 04:01 AM    Glucose 110 (H) 02/16/2021 04:01 AM    Glucose 110 (H) 02/16/2021 04:01 AM    BUN 9 02/16/2021 04:01 AM    Creatinine 0.77 02/16/2021 04:01 AM    BUN/Creatinine ratio 12 02/16/2021 04:01 AM    GFR est AA >60 02/16/2021 04:01 AM    GFR est non-AA >60 02/16/2021 04:01 AM    Calcium 9.8 02/16/2021 04:01 AM    Bilirubin, total 0.3 02/16/2021 04:01 AM    Alk.  phosphatase 47 02/16/2021 04:01 AM    Protein, total 7.2 02/16/2021 04:01 AM    Albumin 3.9 02/16/2021 04:01 AM    Globulin 3.3 02/16/2021 04:01 AM    A-G Ratio 1.2 02/16/2021 04:01 AM    ALT (SGPT) 35 02/16/2021 04:01 AM      Vitals:    02/15/21 1108 02/15/21 1552 02/15/21 2004 02/16/21 0725   BP: (!) 143/87 131/82 (!) 146/94 (!) 145/84   Pulse: 90 94 (!) 101 90   Resp: 16 16 18 16   Temp: 98 °F (36.7 °C) 98 °F (36.7 °C) 98.9 °F (37.2 °C) 97.4 °F (36.3 °C)   SpO2: 98% 99% 99% 98%   Weight:       Height:           No results found for: VALF2, VALAC, VALP, VALPR, DS6, CRBAM, CRBAMP, CARB2, XCRBAM  No results found for: LITHM    Vital Signs  Patient Vitals for the past 24 hrs:   Temp Pulse Resp BP SpO2   02/16/21 0725 97.4 °F (36.3 °C) 90 16 (!) 145/84 98 %   02/15/21 2004 98.9 °F (37.2 °C) (!) 101 18 (!) 146/94 99 %   02/15/21 1552 98 °F (36.7 °C) 94 16 131/82 99 %     Wt Readings from Last 3 Encounters:   02/14/21 68 kg (150 lb)     Temp Readings from Last 3 Encounters:   02/16/21 97.4 °F (36.3 °C)     BP Readings from Last 3 Encounters:   02/16/21 (!) 145/84     Pulse Readings from Last 3 Encounters:   02/16/21 90       Radiology (reviewed/updated 2/16/2021)  No results found.     Current Facility-Administered Medications   Medication Dose Route Frequency Provider Last Rate Last Admin    budesonide-formoteroL (SYMBICORT) 160-4.5 mcg/actuation HFA inhaler 2 Puff  2 Puff Inhalation BID RT Ethan Zamora MD   2 Puff at 02/16/21 0900    magnesium oxide (MAG-OX) tablet 400 mg  400 mg Oral QHS Shakira Sandoval NP   400 mg at 02/15/21 2114    nicotine (NICODERM CQ) 14 mg/24 hr patch 1 Patch  1 Patch TransDERmal DAILY Shakira Sandoval NP   1 Patch at 02/16/21 0838    QUEtiapine (SEROquel) tablet 350 mg  350 mg Oral QHS Shakira Sandoval NP   350 mg at 02/15/21 2114    azelastine (ASTELIN) 137mcg/spray nasal spray  2 Spray Both Nostrils BID Daryn Contreras NP   2 [place] at 02/16/21 0756    baclofen (LIORESAL) tablet 20 mg  20 mg Oral TID Daryn Contreras NP   20 mg at 02/16/21 0755    fenofibrate nanocrystallized (TRICOR) tablet 145 mg  145 mg Oral DAILY Daryn Contreras NP   145 mg at 02/16/21 0756    montelukast (SINGULAIR) tablet 10 mg  10 mg Oral DAILY Daryn Contreras NP   10 mg at 02/16/21 0755    tamsulosin (FLOMAX) capsule 0.4 mg  0.4 mg Oral DAILY Charlette Karimi NP   0.4 mg at 02/16/21 4484    methyl salicylate-menthol (BENGAY) 15-10 % cream   Topical TID PRN Gil Meléndez NP   Given at 02/15/21 1636    OLANZapine (ZyPREXA) tablet 5 mg  5 mg Oral Q6H PRN Mayelin Rojo NP   5 mg at 02/14/21 0206    haloperidol lactate (HALDOL) injection 5 mg  5 mg IntraMUSCular Q6H PRN Mayelin Rojo NP        benztropine (COGENTIN) tablet 1 mg  1 mg Oral BID PRN Mayelin Song NP        diphenhydrAMINE (BENADRYL) injection 50 mg  50 mg IntraMUSCular BID PRN Mayelin Rojo NP        hydrOXYzine HCL (ATARAX) tablet 50 mg  50 mg Oral TID PRN Mayelin Song NP   50 mg at 02/15/21 2326    LORazepam (ATIVAN) injection 1 mg  1 mg IntraMUSCular Q4H PRN Mayelin Rojo NP        traZODone (DESYREL) tablet 50 mg  50 mg Oral QHS PRN Mayelin Rojo NP   50 mg at 02/13/21 2344    acetaminophen (TYLENOL) tablet 650 mg  650 mg Oral Q4H PRN Mayelin Rojo NP   650 mg at 02/16/21 0757    magnesium hydroxide (MILK OF MAGNESIA) 400 mg/5 mL oral suspension 30 mL  30 mL Oral DAILY PRN Mayelin Rojo NP        ibuprofen (MOTRIN) tablet 400 mg  400 mg Oral Q8H PRN Mayelin Rojo NP   400 mg at 02/16/21 0456       Side Effects: (reviewed/updated 2/16/2021)  None reported or admitted to.     Review of Systems: (reviewed/updated 2/16/2021)  Appetite: good  Sleep: good   All other Review of Systems: delusions    Mental Status Exam:  Eye contact: Good eye contact  Psychomotor activity: Relaxed   Speech is spontaneous  Thought process: Logical and goal directed   Mood is \"ok\"  Affect: Full   Perception: No avh  Suicidal ideation: No si  Homicidal ideation: No hi  Insight/judgment: Poor  Cognition is grossly intact      Physical Exam:  Musculoskeletal system: steady gait  Tremor not present  Cog wheeling not present      Assessment and Plan:  Paola Quintero meets criteria for a diagnosis of Unspecified psychotic disorder, rule out schizoaffective disorder, rule out bipolar disorder with psychotic features; schizophrenia. Continue current medications as prescribed. We will closely monitor for safety. We will encourage reality orientation. Disposition planning to continue. I certify that this patients inpatient psychiatric hospital services furnished since the previous certification were, and continue to be, required for treatment that could reasonably be expected to improve the patient's condition, or for diagnostic study, and that the patient continues to need, on a daily basis, active treatment furnished directly by or requiring the supervision of inpatient psychiatric facility personnel. In addition, the hospital records show that services furnished were intensive treatment services, admission or related services, or equivalent services.       Signed:  Raven Fried NP  2/16/2021

## 2021-02-16 NOTE — PROGRESS NOTES
Laboratory Monitoring for Antipsychotics: This patient is currently prescribed the following medication(s):   Current Facility-Administered Medications   Medication Dose Route Frequency    budesonide-formoteroL (SYMBICORT) 160-4.5 mcg/actuation HFA inhaler 2 Puff  2 Puff Inhalation BID RT    magnesium oxide (MAG-OX) tablet 400 mg  400 mg Oral QHS    nicotine (NICODERM CQ) 14 mg/24 hr patch 1 Patch  1 Patch TransDERmal DAILY    QUEtiapine (SEROquel) tablet 350 mg  350 mg Oral QHS    azelastine (ASTELIN) 137mcg/spray nasal spray  2 Spray Both Nostrils BID    baclofen (LIORESAL) tablet 20 mg  20 mg Oral TID    fenofibrate nanocrystallized (TRICOR) tablet 145 mg  145 mg Oral DAILY    montelukast (SINGULAIR) tablet 10 mg  10 mg Oral DAILY    tamsulosin (FLOMAX) capsule 0.4 mg  0.4 mg Oral DAILY     The following labs have been completed for monitoring of antipsychotics and/or mood stabilizers:    Height, Weight, BMI Estimation  Estimated body mass index is 25.75 kg/m² as calculated from the following:    Height as of this encounter: 162.6 cm (64\"). Weight as of this encounter: 68 kg (150 lb). Vital Signs/Blood Pressure  Visit Vitals  BP (!) 145/84 (BP 1 Location: Right arm, BP Patient Position: Sitting)   Pulse 90   Temp 97.4 °F (36.3 °C)   Resp 16   Ht 162.6 cm (64\")   Wt 68 kg (150 lb)   SpO2 98%   BMI 25.75 kg/m²     Renal Function, Hepatic Function and Chemistry  Estimated Creatinine Clearance: 82.8 mL/min (based on SCr of 0.77 mg/dL).     Lab Results   Component Value Date/Time    Sodium 139 02/16/2021 04:01 AM    Potassium 4.1 02/16/2021 04:01 AM    Chloride 109 (H) 02/16/2021 04:01 AM    CO2 24 02/16/2021 04:01 AM    Anion gap 6 02/16/2021 04:01 AM    BUN 9 02/16/2021 04:01 AM    Creatinine 0.77 02/16/2021 04:01 AM    BUN/Creatinine ratio 12 02/16/2021 04:01 AM    Bilirubin, total 0.3 02/16/2021 04:01 AM    Protein, total 7.2 02/16/2021 04:01 AM    Albumin 3.9 02/16/2021 04:01 AM    Globulin 3.3 02/16/2021 04:01 AM    A-G Ratio 1.2 02/16/2021 04:01 AM    ALT (SGPT) 35 02/16/2021 04:01 AM    AST (SGOT) 19 02/16/2021 04:01 AM    Alk. phosphatase 47 02/16/2021 04:01 AM     Lab Results   Component Value Date/Time    Glucose 110 (H) 02/16/2021 04:01 AM    Glucose 110 (H) 02/16/2021 04:01 AM     Lab Results   Component Value Date/Time    Hemoglobin A1c 5.5 02/16/2021 04:01 AM     Hematology  Lab Results   Component Value Date/Time    WBC 13.8 (H) 02/16/2021 04:01 AM    RBC 4.47 02/16/2021 04:01 AM    HGB 13.7 02/16/2021 04:01 AM    HCT 41.5 02/16/2021 04:01 AM    MCV 92.8 02/16/2021 04:01 AM    MCH 30.6 02/16/2021 04:01 AM    MCHC 33.0 02/16/2021 04:01 AM    RDW 13.8 02/16/2021 04:01 AM    PLATELET 950 36/01/4724 04:01 AM     Lipids  Lab Results   Component Value Date/Time    Cholesterol, total 191 02/16/2021 04:01 AM    HDL Cholesterol 48 02/16/2021 04:01 AM    LDL, calculated 103.4 (H) 02/16/2021 04:01 AM    Triglyceride 198 (H) 02/16/2021 04:01 AM    CHOL/HDL Ratio 4.0 02/16/2021 04:01 AM     Thyroid Function  No results found for: TSH, TSH2, TSH3, TSHP, TSHELE, TT3, T3U, T3UP, FRT3, FT3, FT4, FT4P, T4, T4P, FT4T, TT7, TSHEXT    Assessment/Plan:  Recommended baseline laboratory monitoring has been completed based on this patient's current medication regimen. The patient's estimated 10-year ASCVD risk is <7.5%; therefore, the patient is not a candidate for a high-intensity statin. No further intervention needed at this time. Will continue to monitor BP closely. Follow-up metabolic monitoring labs should be completed at least annually.      Rach Chirinos PHARMD

## 2021-02-16 NOTE — PROGRESS NOTES
Patient educated on fall precautions while taking sedative medications. Patient paranoid about \"medications from last night\". Patient educated that med nurse will show her all medications prior to taking out of packages. Will continue to educated and monitor. Problem: Altered Thought Process (Adult/Pediatric)  Goal: *STG: Complies with medication therapy  Outcome: Progressing Towards Goal     Problem: Falls - Risk of  Goal: *Absence of Falls  Description: Document Augusto Fall Risk and appropriate interventions in the flowsheet.   Outcome: Progressing Towards Goal  Note: Fall Risk Interventions:            Medication Interventions: Teach patient to arise slowly, Evaluate medications/consider consulting pharmacy

## 2021-02-17 VITALS
HEART RATE: 79 BPM | OXYGEN SATURATION: 98 % | WEIGHT: 150 LBS | BODY MASS INDEX: 25.61 KG/M2 | RESPIRATION RATE: 16 BRPM | SYSTOLIC BLOOD PRESSURE: 139 MMHG | HEIGHT: 64 IN | DIASTOLIC BLOOD PRESSURE: 61 MMHG | TEMPERATURE: 97.9 F

## 2021-02-17 PROCEDURE — 74011250637 HC RX REV CODE- 250/637: Performed by: NURSE PRACTITIONER

## 2021-02-17 PROCEDURE — 74011250637 HC RX REV CODE- 250/637: Performed by: PSYCHIATRY & NEUROLOGY

## 2021-02-17 RX ORDER — HYDROXYZINE 50 MG/1
50 TABLET, FILM COATED ORAL
Qty: 30 TAB | Refills: 0 | Status: SHIPPED | OUTPATIENT
Start: 2021-02-17

## 2021-02-17 RX ORDER — QUETIAPINE FUMARATE 50 MG/1
350 TABLET, FILM COATED ORAL
Qty: 210 TAB | Refills: 0 | Status: SHIPPED | OUTPATIENT
Start: 2021-02-17

## 2021-02-17 RX ADMIN — MONTELUKAST 10 MG: 10 TABLET, FILM COATED ORAL at 09:07

## 2021-02-17 RX ADMIN — BACLOFEN 20 MG: 10 TABLET ORAL at 08:37

## 2021-02-17 RX ADMIN — TAMSULOSIN HYDROCHLORIDE 0.4 MG: 0.4 CAPSULE ORAL at 09:07

## 2021-02-17 RX ADMIN — FENOFIBRATE 145 MG: 145 TABLET ORAL at 08:38

## 2021-02-17 RX ADMIN — AZELASTINE 2 SPRAY: 1 SPRAY, METERED NASAL at 09:07

## 2021-02-17 RX ADMIN — BUDESONIDE AND FORMOTEROL FUMARATE DIHYDRATE 2 PUFF: 160; 4.5 AEROSOL RESPIRATORY (INHALATION) at 09:07

## 2021-02-17 NOTE — DISCHARGE SUMMARY
PSYCHIATRIC DISCHARGE SUMMARY      Patient: Bere Campbell MRN: [id number]  SSN: [id number]    YOB: 1970  Age: 48 y.o. Sex: female        Date of Admission: 2/13/2021  Date of Discharge:2/17/2021       Type of Discharge:  REGULAR     Admission data:  CHIEF COMPLAINT: \"I can predict the future. \"     HISTORY OF PRESENTING ILLNESS: The patient is a 27-year-old female who is currently admitted at [address] [place] [address] on a voluntary basis. She states that she is currently seeing Dr. Marilin Roman at [place], [place], and is currently being treated for bipolar disorder. She stated she has been admitted numerous times in the past, and her last inpatient psychiatric admission was back in April 2018. She is a transfer from [facility]. She stated she does not really know why she is here in the hospital, all she knows is that she had a psych premonition. She also has not slept in days due to the suspicion that she is being poisoned and that she has a mission to accomplish. She notes that she needs to save the planet. She stated this all started about a month ago. When she gets the power, her hand turns red. She states that the Gods have given her the power over a certain period of time. She reports that her daughter was attempting to kill her via serotonin poisoning. She is convinced that she is able to identify gifts in others. She also believes that Trump together with Hieu Garcia and the Russians and Nazis will start a war \"to rid the world of the weak. \" She is also convinced that her mother was trying to kill her. The patient has a history of overdose in a combination of alcohol and pills. She also has a history of drinking bleach. She reports that she currently has ghosts and demons working for her and that she is working against others who are trying to kill off the weak because they have gifts.  Her urine drug screen was positive for marijuana, and she states she smokes it on a daily basis. She currently denies suicidal ideation, homicidal ideation, but continues to endorse paranoia and delusions. PAST MEDICAL HISTORY: See H and P. No past medical history on file. Labs: (reviewed/updated 2/16/2021)   Patient Vitals for the past 8 hrs:    BP Temp Pulse Resp SpO2   02/16/21 0725 (!) 145/84 97.4 °F (36.3 °C) 90 16 98 %           Labs Reviewed   METABOLIC PANEL, COMPREHENSIVE - Abnormal; Notable for the following components:   Result Value     Chloride 109 (*)     Glucose 110 (*)     All other components within normal limits   MAGNESIUM - Abnormal; Notable for the following components:    Magnesium 2.5 (*)     All other components within normal limits   CBC WITH AUTOMATED DIFF - Abnormal; Notable for the following components:    WBC 13.8 (*)     IMMATURE GRANULOCYTES 1 (*)     ABS. NEUTROPHILS 8.1 (*)     ABS. LYMPHOCYTES 4.5 (*)     ABS. IMM. GRANS. 0.1 (*)     All other components within normal limits   GLUCOSE, FASTING - Abnormal; Notable for the following components:    Glucose 110 (*)     All other components within normal limits   LIPID PANEL - Abnormal; Notable for the following components:    Triglyceride 198 (*)     LDL, calculated 103.4 (*)     All other components within normal limits   HEMOGLOBIN A1C WITH EAG           Lab Results   Component Value Date/Time    Sodium 139 02/16/2021 04:01 AM    Potassium 4.1 02/16/2021 04:01 AM    Chloride 109 (H) 02/16/2021 04:01 AM    CO2 24 02/16/2021 04:01 AM    Anion gap 6 02/16/2021 04:01 AM    Glucose 110 (H) 02/16/2021 04:01 AM    Glucose 110 (H) 02/16/2021 04:01 AM    BUN 9 02/16/2021 04:01 AM    Creatinine 0.77 02/16/2021 04:01 AM    BUN/Creatinine ratio 12 02/16/2021 04:01 AM    GFR est AA >60 02/16/2021 04:01 AM    GFR est non-AA >60 02/16/2021 04:01 AM    Calcium 9.8 02/16/2021 04:01 AM    Bilirubin, total 0.3 02/16/2021 04:01 AM    Alk.  phosphatase 47 02/16/2021 04:01 AM    Protein, total 7.2 02/16/2021 04:01 AM Albumin 3.9 02/16/2021 04:01 AM    Globulin 3.3 02/16/2021 04:01 AM    A-G Ratio 1.2 02/16/2021 04:01 AM    ALT (SGPT) 35 02/16/2021 04:01 AM              Admission on 02/13/2021   Component Date Value Ref Range Status     Ventricular Rate 02/15/2021 101  BPM Final    Atrial Rate 02/15/2021 101  BPM Final    P-R Interval 02/15/2021 120  ms Final    QRS Duration 02/15/2021 76  ms Final    Q-T Interval 02/15/2021 338  ms Final    QTC Calculation (Bezet) 02/15/2021 438  ms Final    Calculated P Axis 02/15/2021 42  degrees Final    Calculated R Axis 02/15/2021 73  degrees Final    Calculated T Axis 02/15/2021 60  degrees Final    Diagnosis 02/15/2021   Final   Value:Sinus tachycardia   No previous ECGs available   Confirmed by Gardenia Dsouza MD, [facility] ([id number]) on 2/15/2021 1:55:36 PM     Sodium 02/16/2021 139  136 - 145 mmol/L Final    Potassium 02/16/2021 4.1  3.5 - 5.1 mmol/L Final    Chloride 02/16/2021 109* 97 - 108 mmol/L Final    CO2 02/16/2021 24  21 - 32 mmol/L Final    Anion gap 02/16/2021 6  5 - 15 mmol/L Final    Glucose 02/16/2021 110* 65 - 100 mg/dL Final    BUN 02/16/2021 9  6 - 20 MG/DL Final    Creatinine 02/16/2021 0.77  0.55 - 1.02 MG/DL Final    BUN/Creatinine ratio 02/16/2021 12  12 - 20  Final    GFR est AA 02/16/2021 >60  >60 ml/min/1.73m2 Final    GFR est non-AA 02/16/2021 >60  >60 ml/min/1.73m2 Final    Calcium 02/16/2021 9.8  8.5 - 10.1 MG/DL Final    Bilirubin, total 02/16/2021 0.3  0.2 - 1.0 MG/DL Final    ALT (SGPT) 02/16/2021 35  12 - 78 U/L Final    AST (SGOT) 02/16/2021 19  15 - 37 U/L Final    Alk.  phosphatase 02/16/2021 47  45 - 117 U/L Final    Protein, total 02/16/2021 7.2  6.4 - 8.2 g/dL Final    Albumin 02/16/2021 3.9  3.5 - 5.0 g/dL Final    Globulin 02/16/2021 3.3  2.0 - 4.0 g/dL Final    A-G Ratio 02/16/2021 1.2  1.1 - 2.2  Final    Magnesium 02/16/2021 2.5* 1.6 - 2.4 mg/dL Final    WBC 02/16/2021 13.8* 3.6 - 11.0 K/uL Final    RBC 02/16/2021 4.47 3.80 - 5.20 M/uL Final    HGB 02/16/2021 13.7  11.5 - 16.0 g/dL Final    HCT 02/16/2021 41.5  35.0 - 47.0 % Final    MCV 02/16/2021 92.8  80.0 - 99.0 FL Final    MCH 02/16/2021 30.6  26.0 - 34.0 PG Final    MCHC 02/16/2021 33.0  30.0 - 36.5 g/dL Final    RDW 02/16/2021 13.8  11.5 - 14.5 % Final    PLATELET 28/26/6196 719  150 - 400 K/uL Final    MPV 02/16/2021 10.4  8.9 - 12.9 FL Final    NRBC 02/16/2021 0.0  0  WBC Final    ABSOLUTE NRBC 02/16/2021 0.00  0.00 - 0.01 K/uL Final    NEUTROPHILS 02/16/2021 59  32 - 75 % Final    LYMPHOCYTES 02/16/2021 32  12 - 49 % Final    MONOCYTES 02/16/2021 7  5 - 13 % Final    EOSINOPHILS 02/16/2021 1  0 - 7 % Final    BASOPHILS 02/16/2021 0  0 - 1 % Final    IMMATURE GRANULOCYTES 02/16/2021 1* 0.0 - 0.5 % Final    ABS. NEUTROPHILS 02/16/2021 8.1* 1.8 - 8.0 K/UL Final    ABS. LYMPHOCYTES 02/16/2021 4.5* 0.8 - 3.5 K/UL Final    ABS. MONOCYTES 02/16/2021 1.0  0.0 - 1.0 K/UL Final    ABS. EOSINOPHILS 02/16/2021 0.1  0.0 - 0.4 K/UL Final    ABS. BASOPHILS 02/16/2021 0.1  0.0 - 0.1 K/UL Final    ABS. IMM.  GRANS. 02/16/2021 0.1* 0.00 - 0.04 K/UL Final    DF 02/16/2021 AUTOMATED   Final    Hemoglobin A1c 02/16/2021 5.5  4.0 - 5.6 % Final    Est. average glucose 02/16/2021 111  mg/dL Final    Glucose 02/16/2021 110* 65 - 100 MG/DL Final    LIPID PROFILE 02/16/2021   Final    Cholesterol, total 02/16/2021 191  <200 MG/DL Final    Triglyceride 02/16/2021 198* <150 MG/DL Final    HDL Cholesterol 02/16/2021 48  MG/DL Final    LDL, calculated 02/16/2021 103.4* 0 - 100 MG/DL Final    VLDL, calculated 02/16/2021 39.6  MG/DL Final    CHOL/HDL Ratio 02/16/2021 4.0  0.0 - 5.0  Final            Vitals:    02/15/21 1108 02/15/21 1552 02/15/21 2004 02/16/21 0725   BP: (!) 143/87 131/82 (!) 146/94 (!) 145/84   Pulse: 90 94 (!) 101 90   Resp: 16 16 18 16   Temp: 98 °F (36.7 °C) 98 °F (36.7 °C) 98.9 °F (37.2 °C) 97.4 °F (36.3 °C)   SpO2: 98% 99% 99% 98%   Weight: Height:         RADIOLOGY REPORTS:   No results found for this or any previous visit. No results found. PAST PSYCHIATRIC HISTORY: See above. PSYCHOSOCIAL HISTORY: She reports that she is engaged. She has 4 children. She is currently on SSDI. She states that she took some courses in college. She lives with her fiance and her son. MENTAL STATUS EXAMINATION: She is alert and oriented in all spheres. She is dressed in street clothes. She reports her mood is okay. Affect is blunted. Speech normal rate and rhythm. Thought process logical and goal directed. She denies suicidal ideation, homicidal ideation, auditory or visual hallucination. Paranoia and delusions noteworthy. Memory seems intact. Intelligence seems average. Insight is poor. Judgment is poor. DIAGNOSES: Unspecified psychotic disorder, rule out schizoaffective disorder, rule out bipolar disorder with psychotic features; rule out schizophrenia. Hospital Course:    Patient was admitted to the Psychiatric services for acute psychiatric stabilization in regards to symptomatology as described in the HPI above and placed on Q15 minute checks and suicide precautions. She was started back on her usual medication regimen as well as PRN medications including seroquel, baclofen, and her medical meds. While on the unit Paola Quintero was involved in individual, group, occupational and milieu therapy. She improved gradually and was able to integrate into the milieu with help from the nursing staff. Patients symptoms improved gradually including paranoia, delusions, si. She was appropriate in her interactions, and cooperative with medications and the unit routine. Please see individual progress notes for more specific details regarding patient's hospitalization course. Patient was discharged as per the plan. She had been doing well on the unit as per the report of the nursing staff and my observations.  No PRN medication for agitation, seclusion or restraints were required during the last 48 hours of her stay. Debi Kruger had improved progressively to the point of being stable for discharge and outpatient FU. At this time she did not offer any complaints. Patient denied any SI or HI. Denied any AH or VH. No overt delusions. Was not considered a danger to self or to others and is safe for discharge. Will FU with her appointments and remains motivated to be in treatment. The patient verbalized understanding of her discharge instructions. Allergies:(reviewed/updated 2/17/2021)  Allergies   Allergen Reactions    Pcn [Penicillins] Hives       Side Effects: (reviewed/updated 2/17/2021)  None reported or admitted to. Vital Signs:  Patient Vitals for the past 24 hrs:   Temp Pulse Resp BP SpO2   02/17/21 0720 97.9 °F (36.6 °C) 79 16 139/61 98 %   02/16/21 2015 98.2 °F (36.8 °C) 98 16 139/79 97 %   02/16/21 1554 97.8 °F (36.6 °C) 99 16 (!) 136/91 100 %     Wt Readings from Last 3 Encounters:   02/14/21 68 kg (150 lb)     Temp Readings from Last 3 Encounters:   02/17/21 97.9 °F (36.6 °C)     BP Readings from Last 3 Encounters:   02/17/21 139/61     Pulse Readings from Last 3 Encounters:   02/17/21 79       Labs: (reviewed/updated 2/17/2021)  No results found for this or any previous visit (from the past 24 hour(s)). No results found for: VALF2, VALAC, VALP, VALPR, DS6, CRBAM, CRBAMP, CARB2, XCRBAM  No results found for: [facility]    Radiology (reviewed/updated 2/17/2021)  No results found. Mental Status Exam on Discharge:  General appearance:   Debi Krugre is a 48 y.o. UNKNOWN female who is well groomed, psychomotor activity is WNL  Eye contact: makes good eye contact  Speech: Spontaneous and coherent  Affect : Euthymic  Mood: \"OK\"  Thought Process: Logical, goal directed  Perception: Denies any AH or VH. Thought Content: Denies any SI or Plan  Insight: Partial  Judgement: Fair  Cognition: Intact grossly.      Discharge Diagnosis: Unspecified psychotic disorder      Current Discharge Medication List      START taking these medications    Details   hydrOXYzine HCL (ATARAX) 50 mg tablet Take 1 Tab by mouth three (3) times daily as needed for Anxiety. Indications: anxious  Qty: 30 Tab, Refills: 0         CONTINUE these medications which have CHANGED    Details   QUEtiapine (SEROquel) 50 mg tablet Take 7 Tabs by mouth nightly. Indications: mood  Qty: 210 Tab, Refills: 0         CONTINUE these medications which have NOT CHANGED    Details   magnesium oxide (MAG-OX) 400 mg tablet Take 400 mg by mouth daily. baclofen (LIORESAL) 20 mg tablet Take 20 mg by mouth three (3) times daily. azelastine (ASTELIN) 137 mcg (0.1 %) nasal spray 2 Sprays by Both Nostrils route two (2) times a day. Use in each nostril as directed      tamsulosin (Flomax) 0.4 mg capsule Take 0.4 mg by mouth daily. montelukast (Singulair) 10 mg tablet Take 10 mg by mouth daily. fenofibrate nanocrystallized (TRICOR) 145 mg tablet Take 145 mg by mouth daily. budesonide-formoteroL (Symbicort) 160-4.5 mcg/actuation HFAA Take 2 Puffs by inhalation two (2) times a day. STOP taking these medications       fluticasone propionate (FLONASE) 50 mcg/actuation nasal spray Comments:   Reason for Stopping:         cetirizine (ZYRTEC) 10 mg tablet Comments:   Reason for Stopping:         SUMAtriptan (Imitrex) 50 mg tablet Comments:   Reason for Stopping:         ibuprofen (MOTRIN) 800 mg tablet Comments:   Reason for Stopping: Follow-up Information     Follow up With Specialties Details Why Contact Info    None    None ([phone]) Patient stated that they have no PCP      Irina Harris,    Call on 2/18/2021 10am telehealth appointment call [address] ([phone]) [address]  [phone]  fax [phone]    Dr Jazmín Wilkins  On 3/4/2021 10am.  medication managment.   [address], [address] [address]  [phone]  fax [phone]        WOUND CARE: none needed. Prognosis:   Good / Glenn  based on nature of patient's pathology/ies and treatment compliance issues. Prognosis is greatly dependent upon patient's ability to  follow up on psychiatric/psychotherapy appointments as well as to comply with psychiatric medications as prescribed. I certify that this patients inpatient psychiatric hospital services furnished since the previous certification were, and continue to be, required for treatment that could reasonably be expected to improve the patient's condition, or for diagnostic study, and that the patient continues to need, on a daily basis, active treatment furnished directly by or requiring the supervision of inpatient psychiatric facility personnel. In addition, the hospital records show that services furnished were intensive treatment services, admission or related services, or equivalent services.      Signed:  Sherry Gil NP  2/17/2021

## 2021-02-17 NOTE — PROGRESS NOTES
Pharmacist Discharge Medication Reconciliation    Discharging Provider: Livia Stubbs NP    Significant PMH: No past medical history on file. Chief Complaint for this Admission: No chief complaint on file. Allergies: Pcn [penicillins]    Discharge Medications:   Current Discharge Medication List        START taking these medications    Details   hydrOXYzine HCL (ATARAX) 50 mg tablet Take 1 Tab by mouth three (3) times daily as needed for Anxiety. Indications: anxious  Qty: 30 Tab, Refills: 0           CONTINUE these medications which have CHANGED    Details   QUEtiapine (SEROquel) 50 mg tablet Take 7 Tabs by mouth nightly. Indications: mood  Qty: 210 Tab, Refills: 0           CONTINUE these medications which have NOT CHANGED    Details   magnesium oxide (MAG-OX) 400 mg tablet Take 400 mg by mouth daily. baclofen (LIORESAL) 20 mg tablet Take 20 mg by mouth three (3) times daily. azelastine (ASTELIN) 137 mcg (0.1 %) nasal spray 2 Sprays by Both Nostrils route two (2) times a day. Use in each nostril as directed      tamsulosin (Flomax) 0.4 mg capsule Take 0.4 mg by mouth daily. montelukast (Singulair) 10 mg tablet Take 10 mg by mouth daily. fenofibrate nanocrystallized (TRICOR) 145 mg tablet Take 145 mg by mouth daily. budesonide-formoteroL (Symbicort) 160-4.5 mcg/actuation HFAA Take 2 Puffs by inhalation two (2) times a day.            STOP taking these medications       fluticasone propionate (FLONASE) 50 mcg/actuation nasal spray Comments:   Reason for Stopping:         cetirizine (ZYRTEC) 10 mg tablet Comments:   Reason for Stopping:         SUMAtriptan (Imitrex) 50 mg tablet Comments:   Reason for Stopping:         ibuprofen (MOTRIN) 800 mg tablet Comments:   Reason for Stopping:             The patient's chart, MAR and AVS were reviewed by TACO SimmsD.

## 2021-02-17 NOTE — PROGRESS NOTES
Problem: Falls - Risk of  Goal: *Absence of Falls  Description: Document Bryce Temple Fall Risk and appropriate interventions in the flowsheet.   Outcome: Progressing Towards Goal  Note: Fall Risk Interventions:            Medication Interventions: Patient to call before getting OOB       Patient is in bed with eyes closed, safety measures in place, safety checks q15 mins, will continue to monitor

## 2021-02-17 NOTE — GROUP NOTE
VALERIE  GROUP DOCUMENTATION INDIVIDUAL Group Therapy Note Date: 2/17/2021 Group Start Time: 0830 Group End Time: 0930 Group Topic: [employer] Nicholas Frey kailash [facility] GROUP DOCUMENTATION GROUP Group Therapy Note Attendees: 9/10 Attendance: Attended Patient's Goal:  Discharge today Interventions/techniques: Validated and Supported Follows Directions: Followed directions Interactions: Interacted appropriately Mental Status: Calm Behavior/appearance: Cooperative Goals Achieved: Able to engage in interactions, Able to listen to others and Able to receive feedback Additional Notes:  Staff met with patients individually, or in a small group. Discussed ]what happens in treatment team, groups, staff roles, and unit expectations. Staff assisted patients in establishing a daily goal.  
 
 
Eladio Ace

## 2021-02-17 NOTE — SUICIDE SAFETY PLAN
SAFETY PLAN    A suicide Safety Plan is a document that supports someone when they are having thoughts of suicide. Warning Signs that indicate a suicidal crisis may be developing: What (situations, thoughts, feelings, body sensations, behaviors, etc.) do you experience that lets you know you are beginning to think about suicide? 1. Face and ears get hot  2. Yelling  3. breathing    Internal Coping Strategies:  What things can I do (relaxation techniques, hobbies, physical activities, etc.) to take my mind off my problems without contacting another person? 1. Yoga  2. meditation  3. Music    People and social settings that provide distraction: Who can I call or where can I go to distract me? 1. Name: Anaya Porter   Phone: [phone]  2. Name: Mariola Sepulveda Phone: [phone]  3. Place: [place]            4. Place: Driving with loud music and the windows down. People whom I can ask for help: Who can I call when I need help - for example, friends, family, clergy, someone else? 1. Name: Anaya Porter               Phone: [phone]  2. Name: Trina Yuko   Phone: [phone]  3. Name: Mariola Sepulveda Phone: [phone]    Professionals or [address] [place] [address] I can contact during a crisis: Who can I call for help - for example, my doctor, my psychiatrist, my psychologist, a mental health provider, a suicide hotline? 1. Clinician Name: Brittani Zhu      Phone: [phone]    2. Clinician Name: Dr. Manuela Zaman     Phone: [phone]     3. Suicide Prevention Lifeline: 2-939-095-TALK (3760)    4. [facility]         Emergency Services Phone: [phone]    Making the environment safe: How can I make my environment (house/apartment/living space) safer? For example, can I remove guns, medications, and other items? 1. Organize medications  2.  Clean

## 2021-02-17 NOTE — BH NOTES
GROUP THERAPY PROGRESS NOTE    Patient is participating in Coping Skills Group. Group time: 50 minutes    Personal goal for participation: To boost self-esteem and confidence     Goal orientation: Personal    Group therapy participation: active    Therapeutic interventions reviewed and discussed: Everyone in group is to complete the activity Toot your own horn by filling in and completing various sentences about themselves in a positive way. Group discussion around challenges completing this activity and different thoughts that patients had during it. Each member shared their strengths and coping skills they identified during this activity and discussed things they learned about themselves or where there is room for growth. Impression of participation: Marylu Figueroa actively participated in group. Patient reports she is ready to be discharge. She was able to identify her strengths and discuss her coping skills. Presented with a euthymic mood, clear speech and logical thought process. Cooperative in group.      Lauren Nelson, Supervisee in Social Work

## 2021-02-17 NOTE — BH NOTES
Behavioral Health Transition Record to Provider    Patient Name: Kushal Arguello  YOB: 1970  Medical Record Number: [id number]  Date of Admission: 2/13/2021  Date of Discharge: 2/17/2021    Attending Provider: Ethan Zamora MD  Discharging Provider: Cathy Schaffer NP  To contact this individual call [phone] and ask the  to page. If unavailable, ask to be transferred to [address] [place] [address] Provider on call. [facility] Provider will be available on call 24/7 and during holidays. Primary Care Provider: None    Allergies   Allergen Reactions    Pcn [Penicillins] Hives       Reason for Admission: CHIEF COMPLAINT:  \"I can predict the future. \"     HISTORY OF PRESENTING ILLNESS:  The patient is a 80-year-old female who is currently admitted at [address] [place] [address] on a voluntary basis. She states that she is currently seeing Dr. Sirena Cuellar at [place], [place], and is currently being treated for bipolar disorder. She stated she has been admitted numerous times in the past, and her last inpatient psychiatric admission was back in April 2018. She is a transfer from [facility].  She stated she does not really know why she is here in the hospital, all she knows is that she had a psych premonition. She also has not slept in days due to the suspicion that she is being poisoned and that she has a mission to accomplish. She notes that she needs to save the planet. She stated this all started about a month ago. When she gets the power, her hand turns red. She states that the Gods have given her the power over a certain period of time. She reports that her daughter was attempting to kill her via serotonin poisoning. She is convinced that she is able to identify gifts in others. She also believes that Trump together with Mearl Blank and the Russians and Nazis will start a war \"to rid the world of the weak. \"  She is also convinced that her mother was trying to kill her. The patient has a history of overdose in a combination of alcohol and pills. She also has a history of drinking bleach. She reports that she currently has ghosts and demons working for her and that she is working against others who are trying to kill off the weak because they have gifts. Her urine drug screen was positive for marijuana, and she states she smokes it on a daily basis. She currently denies suicidal ideation, homicidal ideation, but continues to endorse paranoia and delusions. Admission Diagnosis: Bipolar disorder ([facility] [F31.9]    * No surgery found *    Results for orders placed or performed during the hospital encounter of 66/02/73   METABOLIC PANEL, COMPREHENSIVE   Result Value Ref Range    Sodium 139 136 - 145 mmol/L    Potassium 4.1 3.5 - 5.1 mmol/L    Chloride 109 (H) 97 - 108 mmol/L    CO2 24 21 - 32 mmol/L    Anion gap 6 5 - 15 mmol/L    Glucose 110 (H) 65 - 100 mg/dL    BUN 9 6 - 20 MG/DL    Creatinine 0.77 0.55 - 1.02 MG/DL    BUN/Creatinine ratio 12 12 - 20      GFR est AA >60 >60 ml/min/1.73m2    GFR est non-AA >60 >60 ml/min/1.73m2    Calcium 9.8 8.5 - 10.1 MG/DL    Bilirubin, total 0.3 0.2 - 1.0 MG/DL    ALT (SGPT) 35 12 - 78 U/L    AST (SGOT) 19 15 - 37 U/L    Alk.  phosphatase 47 45 - 117 U/L    Protein, total 7.2 6.4 - 8.2 g/dL    Albumin 3.9 3.5 - 5.0 g/dL    Globulin 3.3 2.0 - 4.0 g/dL    A-G Ratio 1.2 1.1 - 2.2     MAGNESIUM   Result Value Ref Range    Magnesium 2.5 (H) 1.6 - 2.4 mg/dL   CBC WITH AUTOMATED DIFF   Result Value Ref Range    WBC 13.8 (H) 3.6 - 11.0 K/uL    RBC 4.47 3.80 - 5.20 M/uL    HGB 13.7 11.5 - 16.0 g/dL    HCT 41.5 35.0 - 47.0 %    MCV 92.8 80.0 - 99.0 FL    MCH 30.6 26.0 - 34.0 PG    MCHC 33.0 30.0 - 36.5 g/dL    RDW 13.8 11.5 - 14.5 %    PLATELET 136 334 - 238 K/uL    MPV 10.4 8.9 - 12.9 FL    NRBC 0.0 0  WBC    ABSOLUTE NRBC 0.00 0.00 - 0.01 K/uL    NEUTROPHILS 59 32 - 75 %    LYMPHOCYTES 32 12 - 49 %    MONOCYTES 7 5 - 13 %    EOSINOPHILS 1 0 - 7 %    BASOPHILS 0 0 - 1 %    IMMATURE GRANULOCYTES 1 (H) 0.0 - 0.5 %    ABS. NEUTROPHILS 8.1 (H) 1.8 - 8.0 K/UL    ABS. LYMPHOCYTES 4.5 (H) 0.8 - 3.5 K/UL    ABS. MONOCYTES 1.0 0.0 - 1.0 K/UL    ABS. EOSINOPHILS 0.1 0.0 - 0.4 K/UL    ABS. BASOPHILS 0.1 0.0 - 0.1 K/UL    ABS. IMM. GRANS. 0.1 (H) 0.00 - 0.04 K/UL    DF AUTOMATED     HEMOGLOBIN A1C WITH EAG   Result Value Ref Range    Hemoglobin A1c 5.5 4.0 - 5.6 %    Est. average glucose 111 mg/dL   GLUCOSE, FASTING   Result Value Ref Range    Glucose 110 (H) 65 - 100 MG/DL   LIPID PANEL   Result Value Ref Range    LIPID PROFILE          Cholesterol, total 191 <200 MG/DL    Triglyceride 198 (H) <150 MG/DL    HDL Cholesterol 48 MG/DL    LDL, calculated 103.4 (H) 0 - 100 MG/DL    VLDL, calculated 39.6 MG/DL    CHOL/HDL Ratio 4.0 0.0 - 5.0     EKG, 12 LEAD, INITIAL   Result Value Ref Range    Ventricular Rate 101 BPM    Atrial Rate 101 BPM    P-R Interval 120 ms    QRS Duration 76 ms    Q-T Interval 338 ms    QTC Calculation (Bezet) 438 ms    Calculated P Axis 42 degrees    Calculated R Axis 73 degrees    Calculated T Axis 60 degrees    Diagnosis       Sinus tachycardia  No previous ECGs available  Confirmed by Pradeep Chaves MD, Serena Evangelista ([id number]) on 2/15/2021 1:55:36 PM         Immunizations administered during this encounter: There is no immunization history on file for this patient. Screening for Metabolic Disorders for Patients on Antipsychotic Medications  (Data obtained from the EMR)    Estimated Body Mass Index  Estimated body mass index is 25.75 kg/m² as calculated from the following:    Height as of this encounter: 5' 4\" (1.626 m). Weight as of this encounter: 68 kg (150 lb).      Vital Signs/Blood Pressure  Visit Vitals  /61 (BP 1 Location: Left arm, BP Patient Position: Sitting)   Pulse 79   Temp 97.9 °F (36.6 °C)   Resp 16   Ht 5' 4\" (1.626 m)   Wt 68 kg (150 lb)   SpO2 98%   BMI 25.75 kg/m²       Blood Glucose/Hemoglobin A1c  Lab Results   Component Value Date/Time    Glucose 110 (H) 02/16/2021 04:01 AM    Glucose 110 (H) 02/16/2021 04:01 AM       Lab Results   Component Value Date/Time    Hemoglobin A1c 5.5 02/16/2021 04:01 AM        Lipid Panel  Lab Results   Component Value Date/Time    Cholesterol, total 191 02/16/2021 04:01 AM    HDL Cholesterol 48 02/16/2021 04:01 AM    LDL, calculated 103.4 (H) 02/16/2021 04:01 AM    Triglyceride 198 (H) 02/16/2021 04:01 AM    CHOL/HDL Ratio 4.0 02/16/2021 04:01 AM        Discharge Diagnosis:  Unspecified psychotic disorder, rule out schizoaffective disorder, rule out bipolar disorder with psychotic features; schizophrenia    Discharge Plan: Patient discharged home via lyft with follow up through existing provider Dr Pepper Canchola. The patient Leroy Huang exhibits the ability to control behavior in a less restrictive environment. Patient's level of functioning is improving. No assaultive/destructive behavior has been observed for the past 24 hours. No suicidal/homicidal threat or behavior has been observed for the past 24 hours. There is no evidence of serious medication side effects. Patient has not been in physical or protective restraints for at least the past 24 hours. If weapons involved, how are they secured? NA    Is patient aware of and in agreement with discharge plan? yes    Arrangements for medication:  Prescriptions given to patient, given a 30 day supply. Copy of discharge instructions to provider?:  [facility]  fax [phone]    Arrangements for transportation home:  lyft    Keep all follow up appointments as scheduled, continue to take prescribed medications per physician instructions.   Mental health crisis number:  128 or your local mental health crisis line number at [phone]    Discharge Medication List and Instructions:   Current Discharge Medication List      START taking these medications    Details   hydrOXYzine HCL (ATARAX) 50 mg tablet Take 1 Tab by mouth three (3) times daily as needed for Anxiety. Indications: anxious  Qty: 30 Tab, Refills: 0         CONTINUE these medications which have CHANGED    Details   QUEtiapine (SEROquel) 50 mg tablet Take 7 Tabs by mouth nightly. Indications: mood  Qty: 210 Tab, Refills: 0         CONTINUE these medications which have NOT CHANGED    Details   magnesium oxide (MAG-OX) 400 mg tablet Take 400 mg by mouth daily. baclofen (LIORESAL) 20 mg tablet Take 20 mg by mouth three (3) times daily. azelastine (ASTELIN) 137 mcg (0.1 %) nasal spray 2 Sprays by Both Nostrils route two (2) times a day. Use in each nostril as directed      tamsulosin (Flomax) 0.4 mg capsule Take 0.4 mg by mouth daily. montelukast (Singulair) 10 mg tablet Take 10 mg by mouth daily. fenofibrate nanocrystallized (TRICOR) 145 mg tablet Take 145 mg by mouth daily. budesonide-formoteroL (Symbicort) 160-4.5 mcg/actuation HFAA Take 2 Puffs by inhalation two (2) times a day. STOP taking these medications       fluticasone propionate (FLONASE) 50 mcg/actuation nasal spray Comments:   Reason for Stopping:         cetirizine (ZYRTEC) 10 mg tablet Comments:   Reason for Stopping:         SUMAtriptan (Imitrex) 50 mg tablet Comments:   Reason for Stopping:         ibuprofen (MOTRIN) 800 mg tablet Comments:   Reason for Stopping:               Unresulted Labs (24h ago, onward)    None        To obtain results of studies pending at discharge, please contact [phone]    Follow-up Information     Follow up With Specialties Details Why Contact Info    None    None (395) Patient stated that they have no PCP      Deleta Ohms,    Call on 2/18/2021 10am telehealth appointment call [address] ([phone]) [address], [address] [address]  [phone]  fax [phone]    Dr Lorie Gabriel  On 3/4/2021 10am.  medication managment.   [address], [address] [address] [phone]  fax [phone]          Advanced Directive:   Does the patient have an appointed surrogate decision maker? No  Does the patient have a Medical Advance Directive? No  Does the patient have a Psychiatric Advance Directive? No  If the patient does not have a surrogate or Medical Advance Directive AND Psychiatric Advance Directive, the patient was offered information on these advance directives Patient declined to complete    Patient Instructions: Please continue all medications until otherwise directed by physician. Tobacco Cessation Discharge Plan:   Is the patient a smoker and needs referral for smoking cessation? Yes  Patient referred to the following for smoking cessation with an appointment? Refused     Patient was offered medication to assist with smoking cessation at discharge? Refused  Was education for smoking cessation added to the discharge instructions? Yes    Alcohol/Substance Abuse Discharge Plan:   Does the patient have a history of substance/alcohol abuse and requires a referral for treatment? No  Patient referred to the following for substance/alcohol abuse treatment with an appointment? Not applicable  Patient was offered medication to assist with alcohol cessation at discharge? Not applicable  Was education for substance/alcohol abuse added to discharge instructions? No    Patient discharged to Home; discussed with patient/caregiver and provided to the patient/caregiver either in hard copy or electronically.

## 2021-02-17 NOTE — INTERDISCIPLINARY ROUNDS
Behavioral Health Interdisciplinary Rounds Patient Name: Vishnu Houser  Age: 48 y.o. Room/Bed:  730/01 Primary Diagnosis: <principal problem not specified> Admission Status: Involuntary Commitment Readmission within 30 days: no and Power of  in place: no 
Patient requires a blocked bed: no          Reason for blocked bed: VTE Prophylaxis: No 
 
Mobility needs/Fall risk: no 
Flu Vaccine : no  
Nutritional Plan: no 
Consults:         
Labs/Testing due today?: no 
 
Sleep hours:  5 Participation in Care/Groups:  yes Medication Compliant?: Yes PRNS (last 24 hours): Antianxiety and Pain Restraints (last 24 hours):  no 
  
CIWA (range last 24 hours): COWS (range last 24 hours): Alcohol screening (AUDIT) completed -   AUDIT Score: 0 If applicable, date SBIRT discussed in treatment team AND documented:  
AUDIT Screen Score: AUDIT Score: 0 Tobacco - patient is a smoker: Have You Used Tobacco in the Past 30 Days: Yes Illegal Drugs use: Have You Used Any Illegal Substances Over the Past 12 Months: Yes 
 
24 hour chart check complete: yes Patient goal(s) for today: prepare for discharge Treatment team focus/goals: reconcile medications and facilitate discharge Progress note: Pt admitted with paranoid delusions about her family trying to kill her. Pt is alert, oriented, clam, cooperative and psychiatrically stable for discharge. LOS:  4  Expected LOS: 4 Financial concerns/prescription coverage:  [place] MEDICARE PART A & B Family contact:  
Family requesting physician contact today:  no 
Discharge plan: return home with fiance and son. Access to weapons :   no Outpatient provider(s): Dr. Debi Cervantes at [place], [place] Patient's preferred phone number for follow up call : [phone] Patient's preferred e-mail address : 
  
 Participating treatment team members: Rodolfo Milner NP; Annemarie Zuniga, RN; Teofilo Bustillos, PharmD;  Antwon Wei MSW

## 2021-02-17 NOTE — PROGRESS NOTES
Problem: Altered Thought Process (Adult/Pediatric)  Goal: *STG: Remains safe in hospital  Outcome: Progressing Towards Goal  Pt denies any suicidal or homicidal thoughts. Contracts for safety. Remains on Q 15 min safety checks.        Goal: *SG: Seeks staff when feelings of anxiety and fear arise  Outcome: Progressing Towards Goal    RELATIONSHIP WITH STAFF HAS BEEN POSITIVE  Goal: *SG: Complies with medication therapy  Outcome: Progressing Towards Goal  Medication compliant  Goal: *SG: Decreased hallucinations  Outcome: Progressing Towards Goal   Denies AN/VH    Problem: Altered Thought Process (Adult/Pediatric)  Goal: *SG: Decreased delusional thinking  Outcome: Not Progressing Towards Goal   Remains with delusional thought relate to psychic and 4 individuals or spirt's that are with her

## 2021-02-17 NOTE — DISCHARGE INSTRUCTIONS
DISCHARGE SUMMARY    [address]  : 1970  MRN: [id number]    The patient Arben Anger exhibits the ability to control behavior in a less restrictive environment. Patient's level of functioning is improving. No assaultive/destructive behavior has been observed for the past 24 hours. No suicidal/homicidal threat or behavior has been observed for the past 24 hours. There is no evidence of serious medication side effects. Patient has not been in physical or protective restraints for at least the past 24 hours. If weapons involved, how are they secured? NA    Is patient aware of and in agreement with discharge plan? yes    Arrangements for medication:  Prescriptions given to patient, given a 30 day supply. Copy of discharge instructions to provider?:  [facility]  fax [phone]    Arrangements for transportation home:  [place]atha    Keep all follow up appointments as scheduled, continue to take prescribed medications per physician instructions.   Mental health crisis number:  404 or your local mental health crisis line number at [address] [place] [address] Emergency WARM LINE      [phone]      M-F: 9am to 9pm      Sat & Sun: [address] [facility] suicide prevention lines:                             [phone] ([phone])       [phone] ([phone])    Crisis Text Line:  Text HOME to [phone]

## 2021-02-17 NOTE — BH NOTES
GROUP THERAPY PROGRESS NOTE    Patient is participating in Process Group. Group time: 50 minutes    Personal goal for participation: Removing negative thoughts from your life    Goal orientation: Personal    Group therapy participation: active    Therapeutic interventions reviewed and discussed: Group discussion around negative thoughts and how to remove them from an individuals life. Group members were given a piece of paper that they cut up into sections. On these pieces, patients wrote down negative thoughts or negative things people have told them. They then ripped the sheet of paper and threw it in the trash and explained why that isnt true. Group discussion involved other ways to remove these thoughts: distraction, expressing gratitude, labeling your thoughts, and changing your relationship with your brain. To wrap up group, members were encouraged to say two positive things about themselves, and one compliment when hey look in the mirror. Impression of participation: Patient actively participated in group. Calm, pleasant and cooperative. Pt had a hard time finding a negative thought, but was able to demonstrate understanding of activity. Supportive of other group members and engaged in conversation and discussion. Presented with a euthymic mood and clear speech.      Lauren Nelson, Supervisee in Social Work

## 2021-02-23 NOTE — PROGRESS NOTES
Reached out to patient at [phone] for follow up. Voicemail came on and no message left due to confidentiality.

## 2022-03-18 PROBLEM — G83.4 CAUDA EQUINA SYNDROME (HCC): Status: ACTIVE | Noted: 2021-02-14

## 2022-03-18 PROBLEM — Z72.0 TOBACCO ABUSE: Status: ACTIVE | Noted: 2021-02-14

## 2022-03-18 PROBLEM — M43.16 SPONDYLOLISTHESIS AT L4-L5 LEVEL: Status: ACTIVE | Noted: 2021-02-14

## 2022-03-19 PROBLEM — F31.9 BIPOLAR DISORDER (HCC): Status: ACTIVE | Noted: 2021-02-13

## 2023-05-14 RX ORDER — MAGNESIUM OXIDE 400 MG/1
400 TABLET ORAL DAILY
COMMUNITY

## 2023-05-14 RX ORDER — AZELASTINE 1 MG/ML
2 SPRAY, METERED NASAL 2 TIMES DAILY
COMMUNITY

## 2023-05-14 RX ORDER — MONTELUKAST SODIUM 10 MG/1
10 TABLET ORAL DAILY
COMMUNITY

## 2023-05-14 RX ORDER — BACLOFEN 20 MG/1
20 TABLET ORAL 3 TIMES DAILY
COMMUNITY

## 2023-05-14 RX ORDER — BUDESONIDE AND FORMOTEROL FUMARATE DIHYDRATE 160; 4.5 UG/1; UG/1
2 AEROSOL RESPIRATORY (INHALATION) 2 TIMES DAILY
COMMUNITY

## 2023-05-14 RX ORDER — TAMSULOSIN HYDROCHLORIDE 0.4 MG/1
0.4 CAPSULE ORAL DAILY
COMMUNITY

## 2023-05-14 RX ORDER — HYDROXYZINE 50 MG/1
50 TABLET, FILM COATED ORAL 3 TIMES DAILY PRN
COMMUNITY
Start: 2021-02-17

## 2023-05-14 RX ORDER — QUETIAPINE FUMARATE 50 MG/1
350 TABLET, FILM COATED ORAL
COMMUNITY
Start: 2021-02-17

## 2023-05-14 RX ORDER — FENOFIBRATE 145 MG/1
145 TABLET, COATED ORAL DAILY
COMMUNITY